# Patient Record
Sex: FEMALE | Race: WHITE | NOT HISPANIC OR LATINO | Employment: FULL TIME | URBAN - METROPOLITAN AREA
[De-identification: names, ages, dates, MRNs, and addresses within clinical notes are randomized per-mention and may not be internally consistent; named-entity substitution may affect disease eponyms.]

---

## 2023-03-08 DIAGNOSIS — I10 HYPERTENSION, UNSPECIFIED TYPE: ICD-10-CM

## 2023-03-08 PROBLEM — K76.0 FATTY LIVER: Status: ACTIVE | Noted: 2023-03-08

## 2023-03-08 PROBLEM — R31.9 HEMATURIA: Status: ACTIVE | Noted: 2023-03-08

## 2023-03-08 PROBLEM — E53.8 VITAMIN B12 DEFICIENCY: Status: ACTIVE | Noted: 2023-03-08

## 2023-03-08 PROBLEM — L98.9 SKIN LESION: Status: ACTIVE | Noted: 2023-03-08

## 2023-03-08 PROBLEM — K21.9 LARYNGOPHARYNGEAL REFLUX (LPR): Status: ACTIVE | Noted: 2023-03-08

## 2023-03-08 PROBLEM — E78.5 HYPERLIPEMIA: Status: ACTIVE | Noted: 2023-03-08

## 2023-03-08 PROBLEM — R91.1 LUNG NODULE: Status: ACTIVE | Noted: 2023-03-08

## 2023-03-08 PROBLEM — Z22.7 LATENT TUBERCULOSIS BY BLOOD TEST: Status: ACTIVE | Noted: 2023-03-08

## 2023-03-08 PROBLEM — E04.9 THYROID GOITER: Status: ACTIVE | Noted: 2023-03-08

## 2023-03-08 PROBLEM — F17.200 SMOKING ADDICTION: Status: ACTIVE | Noted: 2023-03-08

## 2023-03-08 PROBLEM — F17.200 NICOTINE DEPENDENCE: Status: ACTIVE | Noted: 2023-03-08

## 2023-03-08 PROBLEM — F41.9 ANXIETY: Status: ACTIVE | Noted: 2023-03-08

## 2023-03-08 PROBLEM — E78.5 DYSLIPIDEMIA: Status: ACTIVE | Noted: 2023-03-08

## 2023-03-08 PROBLEM — E74.39 GLUCOSE INTOLERANCE: Status: ACTIVE | Noted: 2023-03-08

## 2023-03-08 PROBLEM — J18.9 PNEUMONIA: Status: ACTIVE | Noted: 2023-03-08

## 2023-03-08 PROBLEM — R43.9 TASTE DISORDER: Status: ACTIVE | Noted: 2023-03-08

## 2023-03-08 PROBLEM — Z12.11 ENCOUNTER FOR SCREENING COLONOSCOPY: Status: ACTIVE | Noted: 2023-03-08

## 2023-03-08 PROBLEM — R43.8 HYPOSMIA: Status: ACTIVE | Noted: 2023-03-08

## 2023-03-08 PROBLEM — R73.9 HYPERGLYCEMIA: Status: ACTIVE | Noted: 2023-03-08

## 2023-03-08 PROBLEM — J34.2 NASAL SEPTAL DEVIATION: Status: ACTIVE | Noted: 2023-03-08

## 2023-03-08 PROBLEM — E11.9 DIABETES MELLITUS, TYPE 2 (MULTI): Status: ACTIVE | Noted: 2023-03-08

## 2023-03-08 PROBLEM — K85.90 ACUTE PANCREATITIS (HHS-HCC): Status: ACTIVE | Noted: 2023-03-08

## 2023-03-08 PROBLEM — R19.7 DIARRHEA: Status: ACTIVE | Noted: 2023-03-08

## 2023-03-08 PROBLEM — L52 ERYTHEMA NODOSUM: Status: ACTIVE | Noted: 2023-03-08

## 2023-03-08 PROBLEM — F32.A DEPRESSION: Status: ACTIVE | Noted: 2023-03-08

## 2023-03-08 PROBLEM — H52.13 MYOPIA WITH PRESBYOPIA OF BOTH EYES: Status: ACTIVE | Noted: 2023-03-08

## 2023-03-08 PROBLEM — E87.6 HYPOKALEMIA: Status: ACTIVE | Noted: 2023-03-08

## 2023-03-08 PROBLEM — N76.4 VULVAR ABSCESS: Status: ACTIVE | Noted: 2023-03-08

## 2023-03-08 PROBLEM — Z86.11 HISTORY OF TB (TUBERCULOSIS): Status: ACTIVE | Noted: 2023-03-08

## 2023-03-08 PROBLEM — J01.90 ACUTE SINUSITIS: Status: ACTIVE | Noted: 2023-03-08

## 2023-03-08 PROBLEM — R13.10 DYSPHAGIA: Status: ACTIVE | Noted: 2023-03-08

## 2023-03-08 PROBLEM — D36.9 TUBULAR ADENOMA: Status: ACTIVE | Noted: 2023-03-08

## 2023-03-08 PROBLEM — J35.1 LINGUAL TONSIL HYPERTROPHY: Status: ACTIVE | Noted: 2023-03-08

## 2023-03-08 PROBLEM — H57.89 EYE LUMP: Status: ACTIVE | Noted: 2023-03-08

## 2023-03-08 PROBLEM — E55.9 VITAMIN D DEFICIENCY: Status: ACTIVE | Noted: 2023-03-08

## 2023-03-08 PROBLEM — J31.0 RHINITIS: Status: ACTIVE | Noted: 2023-03-08

## 2023-03-08 PROBLEM — J34.3 HYPERTROPHY, NASAL, TURBINATE: Status: ACTIVE | Noted: 2023-03-08

## 2023-03-08 PROBLEM — E50.9 VITAMIN A DEFICIENCY: Status: ACTIVE | Noted: 2023-03-08

## 2023-03-08 PROBLEM — R43.9 SMELL DISORDER: Status: ACTIVE | Noted: 2023-03-08

## 2023-03-08 PROBLEM — H52.4 MYOPIA WITH PRESBYOPIA OF BOTH EYES: Status: ACTIVE | Noted: 2023-03-08

## 2023-03-08 PROBLEM — R07.89 ATYPICAL CHEST PAIN: Status: ACTIVE | Noted: 2023-03-08

## 2023-03-08 PROBLEM — D06.9 SEVERE CERVICAL DYSPLASIA: Status: ACTIVE | Noted: 2023-03-08

## 2023-03-08 PROBLEM — F32.9 MAJOR DEPRESSION: Status: ACTIVE | Noted: 2023-03-08

## 2023-03-08 PROBLEM — D72.829 LEUCOCYTOSIS: Status: ACTIVE | Noted: 2023-03-08

## 2023-03-08 PROBLEM — J30.9 ALLERGIC RHINITIS: Status: ACTIVE | Noted: 2023-03-08

## 2023-03-08 PROBLEM — R53.83 FATIGUE: Status: ACTIVE | Noted: 2023-03-08

## 2023-03-08 PROBLEM — K21.9 GERD (GASTROESOPHAGEAL REFLUX DISEASE): Status: ACTIVE | Noted: 2023-03-08

## 2023-03-08 RX ORDER — METOPROLOL TARTRATE 25 MG/1
1 TABLET, FILM COATED ORAL 2 TIMES DAILY
COMMUNITY
Start: 2023-02-13 | End: 2023-03-08 | Stop reason: SDUPTHER

## 2023-03-08 RX ORDER — MAGNESIUM GLYCINATE 100 MG
CAPSULE ORAL
COMMUNITY

## 2023-03-08 RX ORDER — METOPROLOL TARTRATE 25 MG/1
25 TABLET, FILM COATED ORAL 2 TIMES DAILY
Qty: 90 TABLET | Refills: 0 | Status: SHIPPED | OUTPATIENT
Start: 2023-03-08 | End: 2023-04-28

## 2023-03-08 RX ORDER — BLOOD-GLUCOSE METER
EACH MISCELLANEOUS
COMMUNITY
End: 2023-07-18

## 2023-03-08 RX ORDER — HYDROXYZINE HYDROCHLORIDE 25 MG/1
25 TABLET, FILM COATED ORAL 3 TIMES DAILY PRN
COMMUNITY
End: 2023-11-22 | Stop reason: ALTCHOICE

## 2023-03-08 RX ORDER — CHOLECALCIFEROL (VITAMIN D3) 25 MCG
TABLET ORAL
COMMUNITY

## 2023-03-08 RX ORDER — LANOLIN ALCOHOL/MO/W.PET/CERES
CREAM (GRAM) TOPICAL
COMMUNITY

## 2023-03-08 RX ORDER — ESCITALOPRAM OXALATE 5 MG/1
1 TABLET ORAL DAILY
COMMUNITY
Start: 2022-12-15 | End: 2023-04-12

## 2023-03-08 RX ORDER — AMLODIPINE BESYLATE 5 MG/1
5 TABLET ORAL DAILY
COMMUNITY
End: 2023-05-11

## 2023-03-08 RX ORDER — ESCITALOPRAM OXALATE 10 MG/1
1 TABLET ORAL DAILY
COMMUNITY
Start: 2023-02-13 | End: 2023-05-11

## 2023-03-08 RX ORDER — ONDANSETRON 4 MG/1
1 TABLET, ORALLY DISINTEGRATING ORAL EVERY 6 HOURS
COMMUNITY
Start: 2022-07-06

## 2023-04-12 ENCOUNTER — OFFICE VISIT (OUTPATIENT)
Dept: PRIMARY CARE | Facility: CLINIC | Age: 63
End: 2023-04-12
Payer: COMMERCIAL

## 2023-04-12 VITALS
DIASTOLIC BLOOD PRESSURE: 68 MMHG | HEIGHT: 64 IN | RESPIRATION RATE: 16 BRPM | BODY MASS INDEX: 24.96 KG/M2 | SYSTOLIC BLOOD PRESSURE: 122 MMHG | WEIGHT: 146.2 LBS | HEART RATE: 73 BPM | TEMPERATURE: 98.2 F | OXYGEN SATURATION: 95 %

## 2023-04-12 DIAGNOSIS — Z09 HOSPITAL DISCHARGE FOLLOW-UP: ICD-10-CM

## 2023-04-12 DIAGNOSIS — E11.9 TYPE 2 DIABETES MELLITUS WITHOUT COMPLICATION, WITHOUT LONG-TERM CURRENT USE OF INSULIN (MULTI): Primary | ICD-10-CM

## 2023-04-12 DIAGNOSIS — F41.9 ANXIETY: ICD-10-CM

## 2023-04-12 DIAGNOSIS — F17.200 SMOKING ADDICTION: ICD-10-CM

## 2023-04-12 DIAGNOSIS — Z86.11 HISTORY OF TB (TUBERCULOSIS): ICD-10-CM

## 2023-04-12 DIAGNOSIS — I10 ESSENTIAL HYPERTENSION: ICD-10-CM

## 2023-04-12 PROBLEM — D22.5 ATYPICAL NEVUS OF BACK: Status: ACTIVE | Noted: 2019-01-08

## 2023-04-12 LAB — POC HEMOGLOBIN A1C: 5.7 % (ref 4.2–6.5)

## 2023-04-12 PROCEDURE — 3078F DIAST BP <80 MM HG: CPT | Performed by: INTERNAL MEDICINE

## 2023-04-12 PROCEDURE — 99214 OFFICE O/P EST MOD 30 MIN: CPT | Performed by: INTERNAL MEDICINE

## 2023-04-12 PROCEDURE — 3074F SYST BP LT 130 MM HG: CPT | Performed by: INTERNAL MEDICINE

## 2023-04-12 PROCEDURE — 83036 HEMOGLOBIN GLYCOSYLATED A1C: CPT | Performed by: INTERNAL MEDICINE

## 2023-04-12 ASSESSMENT — PATIENT HEALTH QUESTIONNAIRE - PHQ9
3. TROUBLE FALLING OR STAYING ASLEEP OR SLEEPING TOO MUCH: SEVERAL DAYS
SUM OF ALL RESPONSES TO PHQ QUESTIONS 1-9: 7
SUM OF ALL RESPONSES TO PHQ9 QUESTIONS 1 AND 2: 4
6. FEELING BAD ABOUT YOURSELF - OR THAT YOU ARE A FAILURE OR HAVE LET YOURSELF OR YOUR FAMILY DOWN: NOT AT ALL
10. IF YOU CHECKED OFF ANY PROBLEMS, HOW DIFFICULT HAVE THESE PROBLEMS MADE IT FOR YOU TO DO YOUR WORK, TAKE CARE OF THINGS AT HOME, OR GET ALONG WITH OTHER PEOPLE: SOMEWHAT DIFFICULT
8. MOVING OR SPEAKING SO SLOWLY THAT OTHER PEOPLE COULD HAVE NOTICED. OR THE OPPOSITE, BEING SO FIGETY OR RESTLESS THAT YOU HAVE BEEN MOVING AROUND A LOT MORE THAN USUAL: NOT AT ALL
7. TROUBLE CONCENTRATING ON THINGS, SUCH AS READING THE NEWSPAPER OR WATCHING TELEVISION: NOT AT ALL
5. POOR APPETITE OR OVEREATING: NOT AT ALL
1. LITTLE INTEREST OR PLEASURE IN DOING THINGS: MORE THAN HALF THE DAYS
2. FEELING DOWN, DEPRESSED OR HOPELESS: MORE THAN HALF THE DAYS
9. THOUGHTS THAT YOU WOULD BE BETTER OFF DEAD, OR OF HURTING YOURSELF: NOT AT ALL
4. FEELING TIRED OR HAVING LITTLE ENERGY: MORE THAN HALF THE DAYS

## 2023-04-12 ASSESSMENT — ENCOUNTER SYMPTOMS
JOINT SWELLING: 0
ARTHRALGIAS: 0
SORE THROAT: 0
ABDOMINAL PAIN: 0
VOMITING: 0
NAUSEA: 0
CHEST TIGHTNESS: 0
DIZZINESS: 0
PALPITATIONS: 0
WEAKNESS: 0
SLEEP DISTURBANCE: 0
WHEEZING: 0
COUGH: 0
CONSTIPATION: 0
CHILLS: 0
SHORTNESS OF BREATH: 0
DYSURIA: 0
DIARRHEA: 0
FATIGUE: 0
ADENOPATHY: 0
UNEXPECTED WEIGHT CHANGE: 0
CONFUSION: 0

## 2023-04-12 NOTE — PROGRESS NOTES
Subjective   Merced Allan is a 62 y.o. female who presents for Follow-up and Diabetes (Follow up -DM - checking daily 98s-130s/ER visit  for SOB, chest pain, panic attacks).  Follow up DM-checking daily at home is been good 90s-130s ,last HGBA1C was 5.7 on 10.31.22, today is 5.7   Er visit follow up - patient went to ER [facility] on 3.31.23 for chest pain and SOB, was told was anxiety related, panic attacks ,feeling better now , no more panic attacks since that day     Diabetes  She presents for her follow-up diabetic visit. She has type 2 diabetes mellitus. No MedicAlert identification noted. Her disease course has been stable. There are no hypoglycemic associated symptoms. Pertinent negatives for hypoglycemia include no confusion or dizziness. Pertinent negatives for diabetes include no fatigue and no weakness.       Review of Systems   Constitutional:  Negative for chills, fatigue and unexpected weight change.        Comment   HENT:  Negative for congestion, ear pain and sore throat.    Respiratory:  Negative for cough, chest tightness, shortness of breath and wheezing.    Cardiovascular:  Negative for palpitations and leg swelling.   Gastrointestinal:  Negative for abdominal pain, constipation, diarrhea, nausea and vomiting.   Genitourinary:  Negative for dysuria and urgency.   Musculoskeletal:  Negative for arthralgias and joint swelling.   Skin:  Negative for rash.   Neurological:  Negative for dizziness and weakness.   Hematological:  Negative for adenopathy.   Psychiatric/Behavioral:  Negative for confusion and sleep disturbance.        Objective   Physical Exam  Constitutional:       Appearance: Normal appearance.   HENT:      Head: Normocephalic and atraumatic.   Eyes:      Conjunctiva/sclera: Conjunctivae normal.   Neck:      Vascular: No carotid bruit.   Cardiovascular:      Rate and Rhythm: Normal rate and regular rhythm.      Heart sounds: No murmur heard.  Pulmonary:      Effort: No respiratory  "distress.      Breath sounds: No wheezing, rhonchi or rales.   Chest:      Chest wall: No tenderness.   Abdominal:      General: Bowel sounds are normal. There is no distension.      Palpations: Abdomen is soft. There is no mass.      Tenderness: There is no abdominal tenderness.   Musculoskeletal:      Cervical back: Neck supple.   Lymphadenopathy:      Cervical: No cervical adenopathy.   Skin:     Coloration: Skin is not jaundiced.      Findings: No rash.   Neurological:      General: No focal deficit present.      Mental Status: She is alert and oriented to person, place, and time. Mental status is at baseline.      Motor: No weakness.      Gait: Gait normal.   Psychiatric:         Mood and Affect: Mood normal.         Behavior: Behavior normal.         Judgment: Judgment normal.       /68 (BP Location: Left arm, Patient Position: Sitting)   Pulse 73   Temp 36.8 °C (98.2 °F)   Resp 16   Ht 1.626 m (5' 4\")   Wt 66.3 kg (146 lb 3.2 oz)   SpO2 95% Comment: RA  BMI 25.10 kg/m²         Assessment/Plan   Problem List Items Addressed This Visit       Diabetes mellitus, type 2 (CMS/HCC) - Primary     Not on medication         Relevant Orders    POCT glycosylated hemoglobin (Hb A1C) manually resulted    Anxiety     Better on lexapro         History of TB (tuberculosis)    Relevant Orders    XR chest 2 views    Smoking addiction     Not ready to quite         Essential hypertension     Controlled , no med side effects             "

## 2023-04-19 ENCOUNTER — TELEPHONE (OUTPATIENT)
Dept: PRIMARY CARE | Facility: CLINIC | Age: 63
End: 2023-04-19
Payer: COMMERCIAL

## 2023-04-19 DIAGNOSIS — Z86.11 HISTORY OF TB (TUBERCULOSIS): Primary | ICD-10-CM

## 2023-04-19 NOTE — TELEPHONE ENCOUNTER
Patient CT  chest is 5.4.2023, she will have the lab done today and states she really needs  a letter for work after you receive the lab report to go to work please

## 2023-04-19 NOTE — TELEPHONE ENCOUNTER
Patient aware of chest xray report, aware to have the CT chest done, aware lab order for TB spot lab in the system,  phone number given to call for paxton and call us back with the paxton date

## 2023-04-24 ENCOUNTER — LAB (OUTPATIENT)
Dept: LAB | Facility: LAB | Age: 63
End: 2023-04-24
Payer: COMMERCIAL

## 2023-04-24 DIAGNOSIS — Z86.11 HISTORY OF TB (TUBERCULOSIS): ICD-10-CM

## 2023-04-24 LAB
COBALAMIN (VITAMIN B12) (PG/ML) IN SER/PLAS: 492 PG/ML (ref 211–911)
MAGNESIUM (MG/DL) IN SER/PLAS: 2.21 MG/DL (ref 1.6–2.4)

## 2023-04-24 PROCEDURE — 86481 TB AG RESPONSE T-CELL SUSP: CPT

## 2023-04-24 PROCEDURE — 36415 COLL VENOUS BLD VENIPUNCTURE: CPT

## 2023-04-26 NOTE — TELEPHONE ENCOUNTER
Patient states she got a letter from ins stating  Ct will be covered , the Ct chest will be on 5.4.23 ,also she is asking if you ordered K level to be done too or possible she misunderstood it was just B12 and MG . Thank you !

## 2023-04-27 DIAGNOSIS — I10 ESSENTIAL HYPERTENSION: Primary | ICD-10-CM

## 2023-04-27 LAB
NIL(NEG) CONTROL SPOT COUNT: ABNORMAL
PANEL A SPOT COUNT: 42
PANEL B SPOT COUNT: 44
POS CONTROL SPOT COUNT: ABNORMAL
T-SPOT. TB INTERPRETATION: ABNORMAL

## 2023-04-28 DIAGNOSIS — I10 HYPERTENSION, UNSPECIFIED TYPE: ICD-10-CM

## 2023-04-28 DIAGNOSIS — R00.2 PALPITATION: Primary | ICD-10-CM

## 2023-04-28 RX ORDER — METOPROLOL TARTRATE 25 MG/1
25 TABLET, FILM COATED ORAL 2 TIMES DAILY
Qty: 60 TABLET | Refills: 1 | Status: SHIPPED | OUTPATIENT
Start: 2023-04-28 | End: 2023-06-05

## 2023-05-08 ENCOUNTER — OFFICE VISIT (OUTPATIENT)
Dept: PRIMARY CARE | Facility: CLINIC | Age: 63
End: 2023-05-08
Payer: COMMERCIAL

## 2023-05-08 ENCOUNTER — TELEPHONE (OUTPATIENT)
Dept: PRIMARY CARE | Facility: CLINIC | Age: 63
End: 2023-05-08

## 2023-05-08 VITALS
WEIGHT: 147.6 LBS | TEMPERATURE: 97.9 F | HEIGHT: 64 IN | HEART RATE: 85 BPM | SYSTOLIC BLOOD PRESSURE: 110 MMHG | DIASTOLIC BLOOD PRESSURE: 62 MMHG | OXYGEN SATURATION: 97 % | RESPIRATION RATE: 16 BRPM | BODY MASS INDEX: 25.2 KG/M2

## 2023-05-08 DIAGNOSIS — R91.1 LUNG NODULE: Primary | ICD-10-CM

## 2023-05-08 DIAGNOSIS — I10 ESSENTIAL HYPERTENSION: ICD-10-CM

## 2023-05-08 DIAGNOSIS — Z22.7 LATENT TUBERCULOSIS BY BLOOD TEST: ICD-10-CM

## 2023-05-08 DIAGNOSIS — K21.9 GASTROESOPHAGEAL REFLUX DISEASE WITHOUT ESOPHAGITIS: ICD-10-CM

## 2023-05-08 DIAGNOSIS — F17.219 CIGARETTE NICOTINE DEPENDENCE WITH NICOTINE-INDUCED DISORDER: ICD-10-CM

## 2023-05-08 DIAGNOSIS — Z12.31 ENCOUNTER FOR SCREENING MAMMOGRAM FOR MALIGNANT NEOPLASM OF BREAST: ICD-10-CM

## 2023-05-08 PROCEDURE — 99214 OFFICE O/P EST MOD 30 MIN: CPT | Performed by: INTERNAL MEDICINE

## 2023-05-08 PROCEDURE — 3074F SYST BP LT 130 MM HG: CPT | Performed by: INTERNAL MEDICINE

## 2023-05-08 PROCEDURE — 3078F DIAST BP <80 MM HG: CPT | Performed by: INTERNAL MEDICINE

## 2023-05-08 ASSESSMENT — ENCOUNTER SYMPTOMS
ARTHRALGIAS: 0
FATIGUE: 0
DIZZINESS: 0
SHORTNESS OF BREATH: 0
SEIZURES: 0
ADENOPATHY: 0
WEAKNESS: 0
CONSTIPATION: 0
COUGH: 0
VOMITING: 0
DIARRHEA: 0
CONFUSION: 0
PALPITATIONS: 0
SLEEP DISTURBANCE: 0
DYSURIA: 0
UNEXPECTED WEIGHT CHANGE: 0
JOINT SWELLING: 0
CHILLS: 0
SORE THROAT: 0
CARDIOVASCULAR NEGATIVE: 1
WHEEZING: 0
RESPIRATORY NEGATIVE: 1
ABDOMINAL PAIN: 0
CHEST TIGHTNESS: 0
NAUSEA: 0

## 2023-05-08 ASSESSMENT — PATIENT HEALTH QUESTIONNAIRE - PHQ9
2. FEELING DOWN, DEPRESSED OR HOPELESS: NOT AT ALL
SUM OF ALL RESPONSES TO PHQ9 QUESTIONS 1 AND 2: 0
1. LITTLE INTEREST OR PLEASURE IN DOING THINGS: NOT AT ALL

## 2023-05-08 NOTE — PROGRESS NOTES
Subjective   Merced Allan is a 62 y.o. female who presents for Follow-up (Follow up test reports).  Follow up chest xray, labs , T spot- 4.2023  Follow up CT chest- 5.2023-  Patient daily smoker 40 y , lately 1 ppd.  Labs and test reviewed, discussed with patient plan          To see  TB clinic soon sec to  positive t spot.    Review of Systems   Constitutional:  Negative for chills, fatigue and unexpected weight change.        Comment   HENT:  Negative for congestion, ear pain and sore throat.    Respiratory: Negative.  Negative for cough, chest tightness, shortness of breath and wheezing.    Cardiovascular: Negative.  Negative for palpitations and leg swelling.   Gastrointestinal:  Negative for abdominal pain, constipation, diarrhea, nausea and vomiting.   Genitourinary:  Negative for dysuria and urgency.   Musculoskeletal:  Negative for arthralgias and joint swelling.   Skin:  Negative for rash.   Neurological:  Negative for dizziness, seizures and weakness.   Hematological:  Negative for adenopathy.   Psychiatric/Behavioral:  Negative for confusion and sleep disturbance.        Objective   Physical Exam  Constitutional:       Appearance: Normal appearance.   HENT:      Head: Normocephalic and atraumatic.   Eyes:      Pupils: Pupils are equal, round, and reactive to light.   Cardiovascular:      Rate and Rhythm: Normal rate and regular rhythm.   Pulmonary:      Effort: Pulmonary effort is normal.      Breath sounds: Normal breath sounds.   Musculoskeletal:         General: Normal range of motion.      Cervical back: Normal range of motion and neck supple.   Skin:     General: Skin is warm.   Neurological:      General: No focal deficit present.      Mental Status: She is alert and oriented to person, place, and time.   Psychiatric:         Mood and Affect: Mood normal.         Behavior: Behavior normal.       /62 (BP Location: Left arm, Patient Position: Sitting)   Pulse 85   Temp 36.6 °C (97.9 °F)    "Resp 16   Ht 1.626 m (5' 4\")   Wt 67 kg (147 lb 9.6 oz)   SpO2 97% Comment: RA  BMI 25.34 kg/m²     Lab Results   Component Value Date    WBC 8.8 07/06/2022    HGB 13.0 07/06/2022    HCT 38.7 07/06/2022    MCV 88 07/06/2022     07/06/2022     Lab Results   Component Value Date    HGBA1C 5.7 04/12/2023   === 04/12/23 ===    CT CHEST WO IV CONTRAST    - Impression -  1. Unchanged few noncalcified lung nodules measuring up to 3 mm,  stable dating back to 05/25/2021, consistent with benign etiology. If  the patient fulfils the criteria for lung cancer screening, continued  annual non contrast CT scan of chest can be performed.(Naresh Garcia  et al., Guidelines for management of incidental pulmonary nodules  detected on CT images: From the Fleischner Society 2017, Radiology.  2017 Jul;284 (1):228-243.) FLEISCHNER FOLLOW UP.ACR.[email or web address]  2. Subtle ground-glass opacities within bilateral lungs,  predominantly involving the upper lobes, most consistent with changes  of respiratory bronchiolitis, overall similar to prior CT on  05/25/2021.  3. Diffuse thickened appearance of the esophagus slightly more  pronounced compared to prior studies, could be due to esophagitis.  Underlying neoplastic process would be less likely. Consider  correlation with endoscopy for further assessment.  4. Mildly prominent nonspecific mediastinal and paraesophageal and  axillary nodes.  5. Focus of increased density/stranding within the anteromedial right  breast as described could be sequela of mastitis or posttraumatic  change among other etiologies. Clinical correlation and follow-up  advised and consider dedicated mammogram/ultrasound for further  assessment.  6.  Additional findings as above.    I personally reviewed the images/study and I agree with the findings  as stated. This study was interpreted at [facility], [place], [place].  Had egd done in 2021,     Assessment/Plan   Problem List Items " Addressed This Visit       GERD (gastroesophageal reflux disease)     Had  egd 2021         Latent tuberculosis by blood test     Positive ppd         Lung nodule - Primary     Ct 2023, stable   Ct in 1y         Nicotine dependence    Essential hypertension     Other Visit Diagnoses       Encounter for screening mammogram for malignant neoplasm of breast        Relevant Orders    BI mammo bilateral screening tomosynthesis

## 2023-05-10 DIAGNOSIS — I10 ESSENTIAL (PRIMARY) HYPERTENSION: Primary | ICD-10-CM

## 2023-05-11 ENCOUNTER — APPOINTMENT (OUTPATIENT)
Dept: PRIMARY CARE | Facility: CLINIC | Age: 63
End: 2023-05-11
Payer: COMMERCIAL

## 2023-05-11 DIAGNOSIS — F32.0 MAJOR DEPRESSIVE DISORDER, SINGLE EPISODE, MILD (CMS-HCC): ICD-10-CM

## 2023-05-11 RX ORDER — ESCITALOPRAM OXALATE 10 MG/1
TABLET ORAL
Qty: 90 TABLET | Refills: 1 | Status: SHIPPED | OUTPATIENT
Start: 2023-05-11 | End: 2023-11-13

## 2023-05-11 RX ORDER — AMLODIPINE BESYLATE 5 MG/1
TABLET ORAL
Qty: 90 TABLET | Refills: 1 | Status: SHIPPED | OUTPATIENT
Start: 2023-05-11 | End: 2023-11-15

## 2023-05-17 ENCOUNTER — TELEPHONE (OUTPATIENT)
Dept: PRIMARY CARE | Facility: CLINIC | Age: 63
End: 2023-05-17

## 2023-05-17 NOTE — TELEPHONE ENCOUNTER
Patient was N/S today ,please call and R/S paxton we have tests reports to revue with the patient,please let me know whe we have paxton date . Thank you !

## 2023-06-01 DIAGNOSIS — R00.2 PALPITATION: ICD-10-CM

## 2023-06-01 DIAGNOSIS — I10 HYPERTENSION, UNSPECIFIED TYPE: Primary | ICD-10-CM

## 2023-06-05 RX ORDER — METOPROLOL TARTRATE 25 MG/1
TABLET, FILM COATED ORAL
Qty: 60 TABLET | Refills: 1 | Status: SHIPPED | OUTPATIENT
Start: 2023-06-05 | End: 2023-07-06

## 2023-07-06 DIAGNOSIS — R00.2 PALPITATION: ICD-10-CM

## 2023-07-06 DIAGNOSIS — I10 HYPERTENSION, UNSPECIFIED TYPE: ICD-10-CM

## 2023-07-06 RX ORDER — METOPROLOL TARTRATE 25 MG/1
TABLET, FILM COATED ORAL
Qty: 60 TABLET | Refills: 3 | Status: SHIPPED | OUTPATIENT
Start: 2023-07-06 | End: 2023-10-13 | Stop reason: SDUPTHER

## 2023-07-12 ENCOUNTER — APPOINTMENT (OUTPATIENT)
Dept: PRIMARY CARE | Facility: CLINIC | Age: 63
End: 2023-07-12
Payer: COMMERCIAL

## 2023-07-17 DIAGNOSIS — E11.9 TYPE 2 DIABETES MELLITUS WITHOUT COMPLICATIONS (MULTI): ICD-10-CM

## 2023-07-18 RX ORDER — BLOOD-GLUCOSE METER
EACH MISCELLANEOUS
Qty: 100 STRIP | Refills: 1 | Status: SHIPPED | OUTPATIENT
Start: 2023-07-18 | End: 2024-01-30 | Stop reason: SDUPTHER

## 2023-08-18 ENCOUNTER — OFFICE VISIT (OUTPATIENT)
Dept: PRIMARY CARE | Facility: CLINIC | Age: 63
End: 2023-08-18
Payer: COMMERCIAL

## 2023-08-18 ENCOUNTER — TELEPHONE (OUTPATIENT)
Dept: PRIMARY CARE | Facility: CLINIC | Age: 63
End: 2023-08-18

## 2023-08-18 VITALS
SYSTOLIC BLOOD PRESSURE: 120 MMHG | TEMPERATURE: 97.3 F | BODY MASS INDEX: 24.52 KG/M2 | RESPIRATION RATE: 16 BRPM | WEIGHT: 143.6 LBS | DIASTOLIC BLOOD PRESSURE: 70 MMHG | OXYGEN SATURATION: 96 % | HEIGHT: 64 IN | HEART RATE: 77 BPM

## 2023-08-18 DIAGNOSIS — R00.2 PALPITATIONS: ICD-10-CM

## 2023-08-18 DIAGNOSIS — M25.512 CHRONIC LEFT SHOULDER PAIN: ICD-10-CM

## 2023-08-18 DIAGNOSIS — E11.9 TYPE 2 DIABETES MELLITUS WITHOUT COMPLICATION, WITHOUT LONG-TERM CURRENT USE OF INSULIN (MULTI): Primary | ICD-10-CM

## 2023-08-18 DIAGNOSIS — G89.29 CHRONIC LEFT SHOULDER PAIN: ICD-10-CM

## 2023-08-18 DIAGNOSIS — I10 ESSENTIAL HYPERTENSION: ICD-10-CM

## 2023-08-18 DIAGNOSIS — Z22.7 LATENT TUBERCULOSIS BY BLOOD TEST: ICD-10-CM

## 2023-08-18 DIAGNOSIS — Z86.11 HISTORY OF TB (TUBERCULOSIS): ICD-10-CM

## 2023-08-18 PROBLEM — Z86.16 PERSONAL HISTORY OF COVID-19: Status: ACTIVE | Noted: 2022-07-06

## 2023-08-18 PROBLEM — R73.9 HYPERGLYCEMIA: Status: RESOLVED | Noted: 2023-03-08 | Resolved: 2023-08-18

## 2023-08-18 PROBLEM — R42 DIZZINESS AND GIDDINESS: Status: ACTIVE | Noted: 2022-07-06

## 2023-08-18 PROBLEM — Z20.822 CONTACT WITH AND (SUSPECTED) EXPOSURE TO COVID-19: Status: ACTIVE | Noted: 2022-07-06

## 2023-08-18 PROBLEM — E74.39 GLUCOSE INTOLERANCE: Status: RESOLVED | Noted: 2023-03-08 | Resolved: 2023-08-18

## 2023-08-18 PROBLEM — R05.9 COUGH, UNSPECIFIED: Status: ACTIVE | Noted: 2022-07-06

## 2023-08-18 PROBLEM — E78.5 HYPERLIPEMIA: Status: RESOLVED | Noted: 2023-03-08 | Resolved: 2023-08-18

## 2023-08-18 PROBLEM — J01.90 ACUTE SINUSITIS: Status: RESOLVED | Noted: 2023-03-08 | Resolved: 2023-08-18

## 2023-08-18 PROBLEM — R05.9 COUGH: Status: ACTIVE | Noted: 2019-01-01

## 2023-08-18 LAB — POC HEMOGLOBIN A1C: 6 % (ref 4.2–6.5)

## 2023-08-18 PROCEDURE — 3078F DIAST BP <80 MM HG: CPT | Performed by: INTERNAL MEDICINE

## 2023-08-18 PROCEDURE — 83036 HEMOGLOBIN GLYCOSYLATED A1C: CPT | Performed by: INTERNAL MEDICINE

## 2023-08-18 PROCEDURE — 99214 OFFICE O/P EST MOD 30 MIN: CPT | Performed by: INTERNAL MEDICINE

## 2023-08-18 PROCEDURE — 3074F SYST BP LT 130 MM HG: CPT | Performed by: INTERNAL MEDICINE

## 2023-08-18 RX ORDER — DICLOFENAC SODIUM 10 MG/G
4 GEL TOPICAL 4 TIMES DAILY
Qty: 60 G | Refills: 3 | Status: SHIPPED | OUTPATIENT
Start: 2023-08-18

## 2023-08-18 RX ORDER — LIDOCAINE 50 MG/G
1 PATCH TOPICAL DAILY
Qty: 30 PATCH | Refills: 3 | Status: SHIPPED | OUTPATIENT
Start: 2023-08-18 | End: 2024-08-17

## 2023-08-18 ASSESSMENT — ENCOUNTER SYMPTOMS
SLEEP DISTURBANCE: 0
SORE THROAT: 0
DYSURIA: 0
DIARRHEA: 0
FATIGUE: 1
ARTHRALGIAS: 1
WEAKNESS: 0
UNEXPECTED WEIGHT CHANGE: 0
CHEST TIGHTNESS: 0
CHILLS: 0
WHEEZING: 0
RESPIRATORY NEGATIVE: 1
DIZZINESS: 0
CONSTIPATION: 0
PALPITATIONS: 0
ABDOMINAL PAIN: 0
COUGH: 0
CARDIOVASCULAR NEGATIVE: 1
CONFUSION: 0
JOINT SWELLING: 0
VOMITING: 0
NAUSEA: 0
ADENOPATHY: 0
HEADACHES: 0
SHORTNESS OF BREATH: 0

## 2023-08-18 ASSESSMENT — PATIENT HEALTH QUESTIONNAIRE - PHQ9
1. LITTLE INTEREST OR PLEASURE IN DOING THINGS: NOT AT ALL
SUM OF ALL RESPONSES TO PHQ9 QUESTIONS 1 AND 2: 0
2. FEELING DOWN, DEPRESSED OR HOPELESS: NOT AT ALL

## 2023-08-18 NOTE — PROGRESS NOTES
Subjective   Merced Allan is a 63 y.o. female who presents for Follow-up (Follow up DM/L shoulder pain /).  Follow up DM- last HGBA1C was 5.7 on 4.12.2023, today is 6.0  Checking BG at home daily  L shoulder pain- x 1 month , getting worse, pain  going on the neck and wrist -did not take any TX yet  Chest xray last done- 4.2023  Ct chest - 5.2023- when to repeat in 2024  Labs - 4.2023  T spot-4.2023 , positive, ct chest no tb, didn't see tbc clinic, no symptoms  Mammo- 5.2023      Review of Systems   Constitutional:  Positive for fatigue. Negative for chills and unexpected weight change.        Sometimes   HENT:  Negative for congestion, ear pain and sore throat.    Respiratory: Negative.  Negative for cough, chest tightness, shortness of breath and wheezing.    Cardiovascular: Negative.  Negative for palpitations and leg swelling.   Gastrointestinal:  Negative for abdominal pain, constipation, diarrhea, nausea and vomiting.   Genitourinary:  Negative for dysuria and urgency.   Musculoskeletal:  Positive for arthralgias. Negative for joint swelling.   Skin:  Negative for rash.   Neurological:  Negative for dizziness, weakness and headaches.   Hematological:  Negative for adenopathy.   Psychiatric/Behavioral:  Negative for confusion and sleep disturbance.        Objective   Physical Exam  Constitutional:       Appearance: Normal appearance.   HENT:      Head: Normocephalic and atraumatic.   Eyes:      Pupils: Pupils are equal, round, and reactive to light.   Cardiovascular:      Rate and Rhythm: Normal rate and regular rhythm.   Pulmonary:      Effort: Pulmonary effort is normal.      Breath sounds: Normal breath sounds.   Musculoskeletal:         General: Normal range of motion.      Cervical back: Normal range of motion and neck supple.      Right foot: No deformity, bunion or Charcot foot.      Left foot: No deformity, bunion or Charcot foot.   Feet:      Right foot:      Skin integrity: Skin integrity normal.  "No ulcer, callus or fissure.      Toenail Condition: Right toenails are normal.      Left foot:      Skin integrity: Skin integrity normal. No ulcer, callus or fissure.      Toenail Condition: Left toenails are normal.      Comments: Foot exam normal,   Skin:     General: Skin is warm.   Neurological:      General: No focal deficit present.      Mental Status: She is alert and oriented to person, place, and time.   Psychiatric:         Mood and Affect: Mood normal.         Behavior: Behavior normal.       /70 (BP Location: Left arm, Patient Position: Sitting)   Pulse 77   Temp 36.3 °C (97.3 °F)   Resp 16   Ht 1.626 m (5' 4\")   Wt 65.1 kg (143 lb 9.6 oz)   SpO2 96% Comment: RA  BMI 24.65 kg/m²       Assessment/Plan   Problem List Items Addressed This Visit       Diabetes mellitus, type 2 (CMS/HCC) - Primary     Hba1c 6, only on diet not on medication, to see eye dr         Relevant Orders    POCT glycosylated hemoglobin (Hb A1C) manually resulted    Referral to Ophthalmology    History of TB (tuberculosis)    Latent tuberculosis by blood test    Essential hypertension    Chronic left shoulder pain    Relevant Medications    diclofenac sodium (Voltaren) 1 % gel gel    lidocaine (Lidoderm) 5 % patch    Other Relevant Orders    Referral to Physical Therapy    RESOLVED: Palpitations       "

## 2023-08-18 NOTE — TELEPHONE ENCOUNTER
Pt is scheduled at 10am- she is asking if she can come in a little later- she said there was an issue at her house last night and she didn't get any sleep, she is hoping to come in at 1130 so she can get some sleep.

## 2023-08-18 NOTE — PATIENT INSTRUCTIONS
Was nice seeing you today.  Continue same medication.  Have lab work done before next appointment if labs were ordered today.  Fu in 3 month.  Call/ contact our office with any concerns.  Please see TBC clinic at [facility]  See ophthalmology  Use Lidoderm patc q12h and diclofenac cream bid

## 2023-11-12 DIAGNOSIS — F32.0 MAJOR DEPRESSIVE DISORDER, SINGLE EPISODE, MILD (CMS-HCC): Primary | ICD-10-CM

## 2023-11-13 RX ORDER — ESCITALOPRAM OXALATE 10 MG/1
TABLET ORAL
Qty: 90 TABLET | Refills: 1 | Status: SHIPPED | OUTPATIENT
Start: 2023-11-13 | End: 2024-04-10 | Stop reason: SDUPTHER

## 2023-11-15 ENCOUNTER — TELEPHONE (OUTPATIENT)
Dept: PRIMARY CARE | Facility: CLINIC | Age: 63
End: 2023-11-15

## 2023-11-15 DIAGNOSIS — I10 ESSENTIAL (PRIMARY) HYPERTENSION: Primary | ICD-10-CM

## 2023-11-15 RX ORDER — AMLODIPINE BESYLATE 5 MG/1
TABLET ORAL
Qty: 90 TABLET | Refills: 0 | Status: SHIPPED | OUTPATIENT
Start: 2023-11-15 | End: 2024-01-30 | Stop reason: SDUPTHER

## 2023-11-22 ENCOUNTER — OFFICE VISIT (OUTPATIENT)
Dept: PRIMARY CARE | Facility: CLINIC | Age: 63
End: 2023-11-22
Payer: COMMERCIAL

## 2023-11-22 ENCOUNTER — APPOINTMENT (OUTPATIENT)
Dept: PRIMARY CARE | Facility: CLINIC | Age: 63
End: 2023-11-22
Payer: COMMERCIAL

## 2023-11-22 ENCOUNTER — ANCILLARY PROCEDURE (OUTPATIENT)
Dept: RADIOLOGY | Facility: CLINIC | Age: 63
End: 2023-11-22
Payer: COMMERCIAL

## 2023-11-22 VITALS
OXYGEN SATURATION: 98 % | DIASTOLIC BLOOD PRESSURE: 74 MMHG | SYSTOLIC BLOOD PRESSURE: 122 MMHG | TEMPERATURE: 98.8 F | HEART RATE: 83 BPM | BODY MASS INDEX: 24.41 KG/M2 | HEIGHT: 64 IN | WEIGHT: 143 LBS

## 2023-11-22 DIAGNOSIS — M25.512 LEFT SHOULDER PAIN, UNSPECIFIED CHRONICITY: ICD-10-CM

## 2023-11-22 DIAGNOSIS — Z86.11 HISTORY OF TB (TUBERCULOSIS): ICD-10-CM

## 2023-11-22 DIAGNOSIS — Z22.7 LATENT TUBERCULOSIS BY BLOOD TEST: ICD-10-CM

## 2023-11-22 DIAGNOSIS — E11.00 TYPE 2 DIABETES MELLITUS WITH HYPEROSMOLARITY WITHOUT COMA, WITHOUT LONG-TERM CURRENT USE OF INSULIN (MULTI): ICD-10-CM

## 2023-11-22 DIAGNOSIS — L52 ERYTHEMA NODOSUM: ICD-10-CM

## 2023-11-22 DIAGNOSIS — Z00.00 ANNUAL PHYSICAL EXAM: Primary | ICD-10-CM

## 2023-11-22 DIAGNOSIS — E55.9 VITAMIN D DEFICIENCY: ICD-10-CM

## 2023-11-22 DIAGNOSIS — R22.9 SUBCUTANEOUS NODULES: ICD-10-CM

## 2023-11-22 DIAGNOSIS — E53.8 VITAMIN B12 DEFICIENCY: ICD-10-CM

## 2023-11-22 LAB — POC HEMOGLOBIN A1C: 6 % (ref 4.2–6.5)

## 2023-11-22 PROCEDURE — 3078F DIAST BP <80 MM HG: CPT | Performed by: INTERNAL MEDICINE

## 2023-11-22 PROCEDURE — 73030 X-RAY EXAM OF SHOULDER: CPT | Mod: LT

## 2023-11-22 PROCEDURE — 4010F ACE/ARB THERAPY RXD/TAKEN: CPT | Performed by: INTERNAL MEDICINE

## 2023-11-22 PROCEDURE — 99396 PREV VISIT EST AGE 40-64: CPT | Performed by: INTERNAL MEDICINE

## 2023-11-22 PROCEDURE — 73030 X-RAY EXAM OF SHOULDER: CPT | Mod: LEFT SIDE | Performed by: RADIOLOGY

## 2023-11-22 PROCEDURE — 83036 HEMOGLOBIN GLYCOSYLATED A1C: CPT | Performed by: INTERNAL MEDICINE

## 2023-11-22 PROCEDURE — 3074F SYST BP LT 130 MM HG: CPT | Performed by: INTERNAL MEDICINE

## 2023-11-22 RX ORDER — GLIMEPIRIDE 1 MG/1
TABLET ORAL
COMMUNITY
Start: 2021-01-21 | End: 2023-11-22 | Stop reason: ALTCHOICE

## 2023-11-22 RX ORDER — IBUPROFEN 400 MG/1
TABLET ORAL
COMMUNITY
Start: 2021-05-06

## 2023-11-22 RX ORDER — AZELASTINE 1 MG/ML
SPRAY, METERED NASAL
COMMUNITY
Start: 2021-02-01

## 2023-11-22 RX ORDER — MELOXICAM 7.5 MG/1
7.5 TABLET ORAL DAILY
Qty: 30 TABLET | Refills: 11 | Status: SHIPPED | OUTPATIENT
Start: 2023-11-22 | End: 2024-11-21

## 2023-11-22 RX ORDER — LANSOPRAZOLE 30 MG/1
CAPSULE, DELAYED RELEASE ORAL
COMMUNITY

## 2023-11-22 RX ORDER — NITROGLYCERIN 0.4 MG/1
TABLET SUBLINGUAL
COMMUNITY

## 2023-11-22 RX ORDER — PREDNISONE 50 MG/1
1 TABLET ORAL DAILY
COMMUNITY
Start: 2023-09-29

## 2023-11-22 RX ORDER — ATORVASTATIN CALCIUM 20 MG/1
20 TABLET, FILM COATED ORAL DAILY
COMMUNITY

## 2023-11-22 RX ORDER — TRIAMCINOLONE ACETONIDE 55 UG/1
SPRAY, METERED NASAL
COMMUNITY
Start: 2021-02-01 | End: 2023-11-22 | Stop reason: ALTCHOICE

## 2023-11-22 RX ORDER — LOSARTAN POTASSIUM 25 MG/1
TABLET ORAL
COMMUNITY

## 2023-11-22 RX ORDER — POTASSIUM CHLORIDE 750 MG/1
TABLET, FILM COATED, EXTENDED RELEASE ORAL
COMMUNITY
Start: 2019-08-06

## 2023-11-22 RX ORDER — ATORVASTATIN CALCIUM 20 MG/1
TABLET, FILM COATED ORAL
COMMUNITY
End: 2023-11-22 | Stop reason: ALTCHOICE

## 2023-11-22 ASSESSMENT — ENCOUNTER SYMPTOMS
DYSURIA: 0
JOINT SWELLING: 0
PALPITATIONS: 0
COUGH: 0
ARTHRALGIAS: 0
DIZZINESS: 0
WEAKNESS: 0
FATIGUE: 0
VOMITING: 0
SORE THROAT: 0
UNEXPECTED WEIGHT CHANGE: 0
ABDOMINAL PAIN: 0
CHEST TIGHTNESS: 0
SHORTNESS OF BREATH: 0
WHEEZING: 0
CONSTIPATION: 0
DIARRHEA: 0
SLEEP DISTURBANCE: 0
CHILLS: 0
CONFUSION: 0
ADENOPATHY: 0
NAUSEA: 0

## 2023-11-22 NOTE — ASSESSMENT & PLAN NOTE
On/off for many years, evaluated in the past by dermatology, no dx given, had bx, will need reevaluation.

## 2023-11-22 NOTE — PROGRESS NOTES
Subjective   Merced Allan is a 63 y.o. female who presents for Annual Exam and Follow-up (DM).  Patient is here for her Annual Physical Exam, CPE and to follow up on DM.  Last A1C was 6.0%  Today A1C is 6.0%  Mammogram 05.18.23  Colonoscopy 8/31/2022, q3 years  Has left shoulder pain and limitation of movement, can't lift it up for 4 month, has left arm numbness, using cream, helped some but stiil has symptoms.  Had tb test positive but didn't go to tb clinic, getting nodule subcutaneous , on/off, this time has redness on one of nodule on her left arm. Has this nodules for many years on/off staying for about 1 month then disappears, seen by dermatology , had bx too, no dx given      Review of Systems   Constitutional:  Negative for chills, fatigue and unexpected weight change.        Comment   HENT:  Negative for congestion, ear pain and sore throat.    Respiratory:  Negative for cough, chest tightness, shortness of breath and wheezing.    Cardiovascular:  Negative for palpitations and leg swelling.   Gastrointestinal:  Negative for abdominal pain, constipation, diarrhea, nausea and vomiting.   Genitourinary:  Negative for dysuria and urgency.   Musculoskeletal:  Negative for arthralgias and joint swelling.   Skin:  Negative for rash.   Neurological:  Negative for dizziness and weakness.   Hematological:  Negative for adenopathy.   Psychiatric/Behavioral:  Negative for confusion and sleep disturbance.    All other systems reviewed and are negative.  === 04/12/23 ===    CT CHEST WO IV CONTRAST    - Impression -  1. Unchanged few noncalcified lung nodules measuring up to 3 mm,  stable dating back to 05/25/2021, consistent with benign etiology. If  the patient fulfils the criteria for lung cancer screening, continued  annual non contrast CT scan of chest can be performed.(Naresh Garcia  et al., Guidelines for management of incidental pulmonary nodules  detected on CT images: From the Fleischner Society 2017,  Radiology.  2017 Jul;284 (1):228-243.) [place]OL[place]S FOLLOW UP.ACR.IF.1  2. Subtle ground-glass opacities within bilateral lungs,  predominantly involving the upper lobes, most consistent with changes  of respiratory bronchiolitis, overall similar to prior CT on  05/25/2021.  3. Diffuse thickened appearance of the esophagus slightly more  pronounced compared to prior studies, could be due to esophagitis.  Underlying neoplastic process would be less likely. Consider  correlation with endoscopy for further assessment.  4. Mildly prominent nonspecific mediastinal and paraesophageal and  axillary nodes.  5. Focus of increased density/stranding within the anteromedial right  breast as described could be sequela of mastitis or posttraumatic  change among other etiologies. Clinical correlation and follow-up  advised and consider dedicated mammogram/ultrasound for further  assessment.  6.  Additional findings as above.    I personally reviewed the images/study and I agree with the findings  as stated. This study was interpreted at [place], [place].    Objective   Physical Exam  Constitutional:       Appearance: Normal appearance.      Comments: Non tender , mobile nodules both arms, on left arm has erythema., limitation of movement of left shoulder.   HENT:      Head: Normocephalic and atraumatic.   Eyes:      Conjunctiva/sclera: Conjunctivae normal.   Neck:      Vascular: No carotid bruit.   Cardiovascular:      Rate and Rhythm: Normal rate and regular rhythm.      Heart sounds: No murmur heard.  Pulmonary:      Effort: No respiratory distress.      Breath sounds: No wheezing, rhonchi or rales.   Chest:      Chest wall: No tenderness.   Abdominal:      General: Bowel sounds are normal. There is no distension.      Palpations: Abdomen is soft. There is no mass.      Tenderness: There is no abdominal tenderness.   Musculoskeletal:         General: Normal range of motion.       "Cervical back: Neck supple.   Lymphadenopathy:      Cervical: No cervical adenopathy.   Skin:     Coloration: Skin is not jaundiced.      Findings: No rash.   Neurological:      General: No focal deficit present.      Mental Status: She is alert and oriented to person, place, and time. Mental status is at baseline.      Motor: No weakness.      Gait: Gait normal.   Psychiatric:         Mood and Affect: Mood normal.         Behavior: Behavior normal.         Judgment: Judgment normal.       /74 (BP Location: Left arm, Patient Position: Sitting)   Pulse 83   Temp 37.1 °C (98.8 °F)   Ht 1.626 m (5' 4\")   Wt 64.9 kg (143 lb)   SpO2 98%   BMI 24.55 kg/m²       Assessment/Plan   Problem List Items Addressed This Visit       Diabetes mellitus, type 2 (CMS/HCC)    Relevant Orders    POCT glycosylated hemoglobin (Hb A1C) manually resulted (Completed)    CBC and Auto Differential    TSH with reflex to Free T4 if abnormal    Urinalysis with Reflex Microscopic    Hepatitis C Antibody    Albumin , Urine Random    History of TB (tuberculosis)    Latent tuberculosis by blood test    Vitamin D deficiency    Vitamin B12 deficiency    Erythema nodosum    Relevant Orders    Referral to Dermatology    Annual physical exam - Primary    Relevant Orders    CBC and Auto Differential    TSH with reflex to Free T4 if abnormal    Urinalysis with Reflex Microscopic    Vitamin B12    Magnesium    Lipid Panel    Hepatitis C Antibody    Albumin , Urine Random    Vitamin D 25-Hydroxy,Total (for eval of Vitamin D levels)    T-Spot TB    Citrulline Antibody, IgG    C-Reactive Protein    Sedimentation Rate    Subcutaneous nodules     On/off for many years, evaluated in the past by dermatology, no dx given, had bx, will need reevaluation.         Relevant Orders    Referral to Dermatology    Left shoulder pain    Relevant Medications    meloxicam (Mobic) 7.5 mg tablet    Other Relevant Orders    XR shoulder left 2+ views    Referral to " Sports Medicine    Referral to Physical Therapy

## 2024-03-21 ENCOUNTER — LAB (OUTPATIENT)
Dept: LAB | Facility: LAB | Age: 64
End: 2024-03-21
Payer: COMMERCIAL

## 2024-03-21 DIAGNOSIS — Z00.00 ANNUAL PHYSICAL EXAM: ICD-10-CM

## 2024-03-21 DIAGNOSIS — I10 ESSENTIAL HYPERTENSION: ICD-10-CM

## 2024-03-21 DIAGNOSIS — E11.00 TYPE 2 DIABETES MELLITUS WITH HYPEROSMOLARITY WITHOUT COMA, WITHOUT LONG-TERM CURRENT USE OF INSULIN (MULTI): ICD-10-CM

## 2024-03-21 LAB
25(OH)D3 SERPL-MCNC: 26 NG/ML (ref 30–100)
ANION GAP SERPL CALC-SCNC: 10 MMOL/L (ref 10–20)
APPEARANCE UR: ABNORMAL
BACTERIA #/AREA URNS AUTO: ABNORMAL /HPF
BASOPHILS # BLD AUTO: 0.03 X10*3/UL (ref 0–0.1)
BASOPHILS NFR BLD AUTO: 0.4 %
BILIRUB UR STRIP.AUTO-MCNC: NEGATIVE MG/DL
BUN SERPL-MCNC: 15 MG/DL (ref 6–23)
CALCIUM SERPL-MCNC: 9 MG/DL (ref 8.6–10.3)
CCP IGG SERPL-ACNC: <1 U/ML
CHLORIDE SERPL-SCNC: 105 MMOL/L (ref 98–107)
CHOLEST SERPL-MCNC: 145 MG/DL (ref 0–199)
CHOLESTEROL/HDL RATIO: 5
CO2 SERPL-SCNC: 30 MMOL/L (ref 21–32)
COLOR UR: YELLOW
CREAT SERPL-MCNC: 0.72 MG/DL (ref 0.5–1.05)
CREAT UR-MCNC: 186.4 MG/DL (ref 20–320)
CRP SERPL-MCNC: <0.1 MG/DL
EGFRCR SERPLBLD CKD-EPI 2021: >90 ML/MIN/1.73M*2
EOSINOPHIL # BLD AUTO: 0.28 X10*3/UL (ref 0–0.7)
EOSINOPHIL NFR BLD AUTO: 3.4 %
ERYTHROCYTE [DISTWIDTH] IN BLOOD BY AUTOMATED COUNT: 13.1 % (ref 11.5–14.5)
ERYTHROCYTE [SEDIMENTATION RATE] IN BLOOD BY WESTERGREN METHOD: 10 MM/H (ref 0–30)
GLUCOSE SERPL-MCNC: 106 MG/DL (ref 74–99)
GLUCOSE UR STRIP.AUTO-MCNC: NEGATIVE MG/DL
HCT VFR BLD AUTO: 41.7 % (ref 36–46)
HCV AB SER QL: NONREACTIVE
HDLC SERPL-MCNC: 28.9 MG/DL
HGB BLD-MCNC: 13.6 G/DL (ref 12–16)
IMM GRANULOCYTES # BLD AUTO: 0.05 X10*3/UL (ref 0–0.7)
IMM GRANULOCYTES NFR BLD AUTO: 0.6 % (ref 0–0.9)
KETONES UR STRIP.AUTO-MCNC: NEGATIVE MG/DL
LDLC SERPL CALC-MCNC: 63 MG/DL
LEUKOCYTE ESTERASE UR QL STRIP.AUTO: ABNORMAL
LYMPHOCYTES # BLD AUTO: 2.67 X10*3/UL (ref 1.2–4.8)
LYMPHOCYTES NFR BLD AUTO: 32.4 %
MAGNESIUM SERPL-MCNC: 2.15 MG/DL (ref 1.6–2.4)
MCH RBC QN AUTO: 30.1 PG (ref 26–34)
MCHC RBC AUTO-ENTMCNC: 32.6 G/DL (ref 32–36)
MCV RBC AUTO: 92 FL (ref 80–100)
MICROALBUMIN UR-MCNC: 48.9 MG/L
MICROALBUMIN/CREAT UR: 26.2 UG/MG CREAT
MONOCYTES # BLD AUTO: 0.56 X10*3/UL (ref 0.1–1)
MONOCYTES NFR BLD AUTO: 6.8 %
MUCOUS THREADS #/AREA URNS AUTO: ABNORMAL /LPF
NEUTROPHILS # BLD AUTO: 4.65 X10*3/UL (ref 1.2–7.7)
NEUTROPHILS NFR BLD AUTO: 56.4 %
NITRITE UR QL STRIP.AUTO: NEGATIVE
NON HDL CHOLESTEROL: 116 MG/DL (ref 0–149)
NRBC BLD-RTO: 0 /100 WBCS (ref 0–0)
PH UR STRIP.AUTO: 5 [PH]
PLATELET # BLD AUTO: 255 X10*3/UL (ref 150–450)
POTASSIUM SERPL-SCNC: 4.1 MMOL/L (ref 3.5–5.3)
PROT UR STRIP.AUTO-MCNC: ABNORMAL MG/DL
RBC # BLD AUTO: 4.52 X10*6/UL (ref 4–5.2)
RBC # UR STRIP.AUTO: ABNORMAL /UL
RBC #/AREA URNS AUTO: ABNORMAL /HPF
SODIUM SERPL-SCNC: 141 MMOL/L (ref 136–145)
SP GR UR STRIP.AUTO: 1.02
SQUAMOUS #/AREA URNS AUTO: ABNORMAL /HPF
TRIGL SERPL-MCNC: 265 MG/DL (ref 0–149)
TSH SERPL-ACNC: 1.26 MIU/L (ref 0.44–3.98)
UROBILINOGEN UR STRIP.AUTO-MCNC: <2 MG/DL
VIT B12 SERPL-MCNC: 434 PG/ML (ref 211–911)
VLDL: 53 MG/DL (ref 0–40)
WBC # BLD AUTO: 8.2 X10*3/UL (ref 4.4–11.3)
WBC #/AREA URNS AUTO: >50 /HPF

## 2024-03-21 PROCEDURE — 36415 COLL VENOUS BLD VENIPUNCTURE: CPT

## 2024-03-21 PROCEDURE — 85025 COMPLETE CBC W/AUTO DIFF WBC: CPT

## 2024-03-21 PROCEDURE — 80061 LIPID PANEL: CPT

## 2024-03-21 PROCEDURE — 86140 C-REACTIVE PROTEIN: CPT

## 2024-03-21 PROCEDURE — 82043 UR ALBUMIN QUANTITATIVE: CPT

## 2024-03-21 PROCEDURE — 82607 VITAMIN B-12: CPT

## 2024-03-21 PROCEDURE — 84443 ASSAY THYROID STIM HORMONE: CPT

## 2024-03-21 PROCEDURE — 81001 URINALYSIS AUTO W/SCOPE: CPT

## 2024-03-21 PROCEDURE — 86803 HEPATITIS C AB TEST: CPT

## 2024-03-21 PROCEDURE — 82306 VITAMIN D 25 HYDROXY: CPT

## 2024-03-21 PROCEDURE — 86200 CCP ANTIBODY: CPT

## 2024-03-21 PROCEDURE — 83735 ASSAY OF MAGNESIUM: CPT

## 2024-03-21 PROCEDURE — 82570 ASSAY OF URINE CREATININE: CPT

## 2024-03-21 PROCEDURE — 80048 BASIC METABOLIC PNL TOTAL CA: CPT

## 2024-03-21 PROCEDURE — 86481 TB AG RESPONSE T-CELL SUSP: CPT

## 2024-03-21 PROCEDURE — 85652 RBC SED RATE AUTOMATED: CPT

## 2024-03-23 LAB
NIL(NEG) CONTROL SPOT COUNT: ABNORMAL
PANEL A SPOT COUNT: 13
PANEL B SPOT COUNT: 29
POS CONTROL SPOT COUNT: ABNORMAL
T-SPOT. TB INTERPRETATION: POSITIVE

## 2024-04-03 ENCOUNTER — OFFICE VISIT (OUTPATIENT)
Dept: PRIMARY CARE | Facility: CLINIC | Age: 64
End: 2024-04-03
Payer: COMMERCIAL

## 2024-04-03 VITALS
WEIGHT: 142.2 LBS | TEMPERATURE: 98.2 F | DIASTOLIC BLOOD PRESSURE: 72 MMHG | SYSTOLIC BLOOD PRESSURE: 124 MMHG | OXYGEN SATURATION: 98 % | RESPIRATION RATE: 16 BRPM | HEART RATE: 72 BPM | HEIGHT: 64 IN | BODY MASS INDEX: 24.28 KG/M2

## 2024-04-03 DIAGNOSIS — I10 ESSENTIAL HYPERTENSION: ICD-10-CM

## 2024-04-03 DIAGNOSIS — E55.9 VITAMIN D DEFICIENCY: ICD-10-CM

## 2024-04-03 DIAGNOSIS — E78.5 DYSLIPIDEMIA: ICD-10-CM

## 2024-04-03 DIAGNOSIS — F17.210 CIGARETTE NICOTINE DEPENDENCE WITHOUT COMPLICATION: ICD-10-CM

## 2024-04-03 DIAGNOSIS — Z71.6 ENCOUNTER FOR SMOKING CESSATION COUNSELING: ICD-10-CM

## 2024-04-03 DIAGNOSIS — E11.00 TYPE 2 DIABETES MELLITUS WITH HYPEROSMOLARITY WITHOUT COMA, WITHOUT LONG-TERM CURRENT USE OF INSULIN (MULTI): Primary | ICD-10-CM

## 2024-04-03 LAB — POC HEMOGLOBIN A1C: 5.8 % (ref 4.2–6.5)

## 2024-04-03 PROCEDURE — 3078F DIAST BP <80 MM HG: CPT | Performed by: INTERNAL MEDICINE

## 2024-04-03 PROCEDURE — 3060F POS MICROALBUMINURIA REV: CPT | Performed by: INTERNAL MEDICINE

## 2024-04-03 PROCEDURE — 83036 HEMOGLOBIN GLYCOSYLATED A1C: CPT | Performed by: INTERNAL MEDICINE

## 2024-04-03 PROCEDURE — 99214 OFFICE O/P EST MOD 30 MIN: CPT | Performed by: INTERNAL MEDICINE

## 2024-04-03 PROCEDURE — 3048F LDL-C <100 MG/DL: CPT | Performed by: INTERNAL MEDICINE

## 2024-04-03 PROCEDURE — 3074F SYST BP LT 130 MM HG: CPT | Performed by: INTERNAL MEDICINE

## 2024-04-03 PROCEDURE — 4010F ACE/ARB THERAPY RXD/TAKEN: CPT | Performed by: INTERNAL MEDICINE

## 2024-04-03 RX ORDER — NICOTINE 7MG/24HR
1 PATCH, TRANSDERMAL 24 HOURS TRANSDERMAL EVERY 24 HOURS
Qty: 28 PATCH | Refills: 0 | Status: SHIPPED | OUTPATIENT
Start: 2024-04-03 | End: 2024-04-10 | Stop reason: SDUPTHER

## 2024-04-03 RX ORDER — METOPROLOL SUCCINATE 25 MG/1
25 TABLET, EXTENDED RELEASE ORAL DAILY
COMMUNITY
Start: 2024-03-01

## 2024-04-03 ASSESSMENT — ENCOUNTER SYMPTOMS
FATIGUE: 0
UNEXPECTED WEIGHT CHANGE: 0
WHEEZING: 0
SLEEP DISTURBANCE: 0
COUGH: 0
SORE THROAT: 0
DIARRHEA: 0
DYSURIA: 0
CHILLS: 0
PALPITATIONS: 0
ABDOMINAL PAIN: 0
VOMITING: 0
SHORTNESS OF BREATH: 0
DIZZINESS: 0
WEAKNESS: 0
NAUSEA: 0
CONFUSION: 0
ADENOPATHY: 0
CONSTIPATION: 0
ARTHRALGIAS: 0
JOINT SWELLING: 0
CHEST TIGHTNESS: 0

## 2024-04-03 NOTE — ASSESSMENT & PLAN NOTE
I have discussed the cardiovascular risk and behavioral modification, nutrition, exercise and elimination of habits contributing to risk. We agreed to a plan how to reduce the risk.  CV risk estimate calculates 22.5 %  Refusing statines

## 2024-04-03 NOTE — PROGRESS NOTES
Subjective   Merced Allan is a 63 y.o. female who presents for Follow-up and Diabetes.  Patient is here to Follow up DM.  Last A1C was 6.0% on 11.22.23. Today A1c is 5.8%  Patient would like to discuss about her triglycerides.  Labs done 03.21.24.  Labs and test reviewed with patient , all question answered, further evaluation, if needed , discussed.  Taking curalin , a natural product, not on other tx.  Seen by TB clinic , no TB, had bx of subcutaneous nodules , all was ok.    Diabetes  She presents for her follow-up diabetic visit. She has type 2 diabetes mellitus. Pertinent negatives for hypoglycemia include no confusion or dizziness. Pertinent negatives for diabetes include no fatigue and no weakness.     Review of Systems   Constitutional:  Negative for chills, fatigue and unexpected weight change.        Comment   HENT:  Negative for congestion, ear pain and sore throat.    Respiratory:  Negative for cough, chest tightness, shortness of breath and wheezing.    Cardiovascular:  Negative for palpitations and leg swelling.   Gastrointestinal:  Negative for abdominal pain, constipation, diarrhea, nausea and vomiting.   Genitourinary:  Negative for dysuria and urgency.   Musculoskeletal:  Negative for arthralgias and joint swelling.   Skin:  Negative for rash.   Neurological:  Negative for dizziness and weakness.   Hematological:  Negative for adenopathy.   Psychiatric/Behavioral:  Negative for confusion and sleep disturbance.    All other systems reviewed and are negative.      Objective   Physical Exam  Constitutional:       Appearance: Normal appearance.   HENT:      Head: Normocephalic and atraumatic.   Eyes:      Pupils: Pupils are equal, round, and reactive to light.   Cardiovascular:      Rate and Rhythm: Normal rate and regular rhythm.   Pulmonary:      Effort: Pulmonary effort is normal.      Breath sounds: Normal breath sounds.   Musculoskeletal:         General: Normal range of motion.      Cervical  "back: Normal range of motion and neck supple.   Skin:     General: Skin is warm.   Neurological:      General: No focal deficit present.      Mental Status: She is alert and oriented to person, place, and time.   Psychiatric:         Mood and Affect: Mood normal.         Behavior: Behavior normal.       /72 (BP Location: Left arm, Patient Position: Sitting)   Pulse 72   Temp 36.8 °C (98.2 °F)   Resp 16   Ht 1.626 m (5' 4\")   Wt 64.5 kg (142 lb 3.2 oz)   SpO2 98%   BMI 24.41 kg/m²     Lab Results   Component Value Date    HGBA1C 5.8 04/03/2024     Lab Results   Component Value Date    WBC 8.2 03/21/2024    HGB 13.6 03/21/2024    HCT 41.7 03/21/2024    MCV 92 03/21/2024     03/21/2024     Lab Results   Component Value Date    GLUCOSE 106 (H) 03/21/2024    CALCIUM 9.0 03/21/2024     03/21/2024    K 4.1 03/21/2024    CO2 30 03/21/2024     03/21/2024    BUN 15 03/21/2024    CREATININE 0.72 03/21/2024     Lab Results   Component Value Date    CHOL 145 03/21/2024    CHOL 137 07/06/2022    CHOL 153 05/27/2021     Lab Results   Component Value Date    HDL 28.9 03/21/2024    HDL 27.0 (A) 07/06/2022    HDL 33.0 (A) 05/27/2021     Lab Results   Component Value Date    LDLCALC 63 03/21/2024     Lab Results   Component Value Date    TRIG 265 (H) 03/21/2024    TRIG 167 (H) 07/06/2022    TRIG 145 05/27/2021     No components found for: \"CHOLHDL\"    Assessment/Plan   Problem List Items Addressed This Visit       Dyslipidemia     I have discussed the cardiovascular risk and behavioral modification, nutrition, exercise and elimination of habits contributing to risk. We agreed to a plan how to reduce the risk.  CV risk estimate calculates 22.5 %  Refusing statines           Diabetes mellitus, type 2 (CMS/HCC) - Primary    Relevant Orders    POCT glycosylated hemoglobin (Hb A1C) manually resulted (Completed)    Nicotine dependence    Relevant Medications    nicotine (Nicoderm CQ) 7 mg/24 hr patch    " Vitamin D deficiency    Essential hypertension    Encounter for smoking cessation counseling

## 2024-04-08 DIAGNOSIS — F32.0 MAJOR DEPRESSIVE DISORDER, SINGLE EPISODE, MILD (CMS-HCC): ICD-10-CM

## 2024-04-08 DIAGNOSIS — F17.210 CIGARETTE NICOTINE DEPENDENCE WITHOUT COMPLICATION: ICD-10-CM

## 2024-04-10 RX ORDER — NICOTINE 7MG/24HR
1 PATCH, TRANSDERMAL 24 HOURS TRANSDERMAL EVERY 24 HOURS
Qty: 28 PATCH | Refills: 0 | Status: SHIPPED | OUTPATIENT
Start: 2024-04-10 | End: 2024-05-08

## 2024-04-10 RX ORDER — ESCITALOPRAM OXALATE 10 MG/1
TABLET ORAL
Qty: 90 TABLET | Refills: 1 | Status: SHIPPED | OUTPATIENT
Start: 2024-04-10

## 2024-04-28 DIAGNOSIS — E11.9 TYPE 2 DIABETES MELLITUS WITHOUT COMPLICATIONS (MULTI): ICD-10-CM

## 2024-04-29 ENCOUNTER — TELEPHONE (OUTPATIENT)
Dept: PRIMARY CARE | Facility: CLINIC | Age: 64
End: 2024-04-29
Payer: COMMERCIAL

## 2024-04-29 DIAGNOSIS — E11.9 TYPE 2 DIABETES MELLITUS WITHOUT COMPLICATIONS (MULTI): Primary | ICD-10-CM

## 2024-04-29 RX ORDER — BLOOD-GLUCOSE METER
EACH MISCELLANEOUS
Qty: 100 STRIP | Refills: 3 | Status: SHIPPED | OUTPATIENT
Start: 2024-04-29

## 2024-04-29 RX ORDER — BLOOD-GLUCOSE METER
EACH MISCELLANEOUS
Qty: 100 STRIP | Refills: 1 | Status: SHIPPED | OUTPATIENT
Start: 2024-04-29 | End: 2024-04-29 | Stop reason: SDUPTHER

## 2024-05-16 ENCOUNTER — TELEPHONE (OUTPATIENT)
Dept: PRIMARY CARE | Facility: CLINIC | Age: 64
End: 2024-05-16
Payer: COMMERCIAL

## 2024-05-16 DIAGNOSIS — I10 ESSENTIAL (PRIMARY) HYPERTENSION: Primary | ICD-10-CM

## 2024-05-16 RX ORDER — AMLODIPINE BESYLATE 5 MG/1
TABLET ORAL
Qty: 90 TABLET | Refills: 0 | Status: SHIPPED | OUTPATIENT
Start: 2024-05-16

## 2024-06-27 ENCOUNTER — OFFICE VISIT (OUTPATIENT)
Dept: URGENT CARE | Facility: CLINIC | Age: 64
End: 2024-06-27
Payer: COMMERCIAL

## 2024-06-27 VITALS
RESPIRATION RATE: 18 BRPM | WEIGHT: 139.7 LBS | BODY MASS INDEX: 23.98 KG/M2 | OXYGEN SATURATION: 96 % | DIASTOLIC BLOOD PRESSURE: 79 MMHG | SYSTOLIC BLOOD PRESSURE: 156 MMHG | HEART RATE: 70 BPM | TEMPERATURE: 97.6 F

## 2024-06-27 DIAGNOSIS — R11.0 NAUSEA: Primary | ICD-10-CM

## 2024-06-27 PROCEDURE — 3077F SYST BP >= 140 MM HG: CPT | Performed by: PHYSICIAN ASSISTANT

## 2024-06-27 PROCEDURE — 3048F LDL-C <100 MG/DL: CPT | Performed by: PHYSICIAN ASSISTANT

## 2024-06-27 PROCEDURE — 99203 OFFICE O/P NEW LOW 30 MIN: CPT | Performed by: PHYSICIAN ASSISTANT

## 2024-06-27 PROCEDURE — 3078F DIAST BP <80 MM HG: CPT | Performed by: PHYSICIAN ASSISTANT

## 2024-06-27 PROCEDURE — 4010F ACE/ARB THERAPY RXD/TAKEN: CPT | Performed by: PHYSICIAN ASSISTANT

## 2024-06-27 PROCEDURE — 3060F POS MICROALBUMINURIA REV: CPT | Performed by: PHYSICIAN ASSISTANT

## 2024-06-27 RX ORDER — ONDANSETRON 4 MG/1
4 TABLET, FILM COATED ORAL EVERY 8 HOURS PRN
Qty: 12 TABLET | Refills: 0 | Status: SHIPPED | OUTPATIENT
Start: 2024-06-27 | End: 2024-07-01

## 2024-06-27 ASSESSMENT — PAIN SCALES - GENERAL: PAINLEVEL: 5

## 2024-06-27 NOTE — PROGRESS NOTES
Subjective   Patient ID: Merced Allan is a 64 y.o. female who presents for Abdominal Pain (1day).  Patient is here for complaints of abdominal pain last night that since resolved and nausea.  Notes that for the last few days she has had diarrhea as well but this is now resolved.  She is bringing in her grandson for conjunctivitis but since she was here states that she would also like to be seen for her improving complaints.  Denies any fevers or chills.  No episodes of vomiting but persistent nausea since last night.  No chest pain or shortness of breath    No past medical history on file.      The remainder of the systems were reviewed and are negative unless noted above      Objective   /79   Pulse 70   Temp 36.4 °C (97.6 °F)   Resp 18   Wt 63.4 kg (139 lb 11.2 oz)   SpO2 96%   BMI 23.98 kg/m²   Physical Exam  Constitutional:       General: She is not in acute distress.     Appearance: Normal appearance. She is not ill-appearing, toxic-appearing or diaphoretic.   HENT:      Head: Normocephalic and atraumatic.      Mouth/Throat:      Mouth: Mucous membranes are moist.      Pharynx: Oropharynx is clear.   Eyes:      Conjunctiva/sclera: Conjunctivae normal.   Cardiovascular:      Rate and Rhythm: Normal rate and regular rhythm.      Heart sounds: No murmur heard.  Pulmonary:      Effort: Pulmonary effort is normal. No respiratory distress.      Breath sounds: Normal breath sounds. No wheezing.   Abdominal:      General: Abdomen is flat. Bowel sounds are normal.      Palpations: Abdomen is soft.      Tenderness: There is no abdominal tenderness. There is no guarding or rebound.   Musculoskeletal:         General: No swelling, tenderness, deformity or signs of injury. Normal range of motion.      Cervical back: Normal range of motion and neck supple.   Skin:     General: Skin is warm and dry.      Findings: No erythema or rash.   Neurological:      General: No focal deficit present.      Mental Status:  She is alert and oriented to person, place, and time.      Gait: Gait normal.         Assessment/Plan   Problem List Items Addressed This Visit       Nausea - Primary    Relevant Medications    ondansetron (Zofran) 4 mg tablet      Based upon the patient's history and physical exam findings likely self-limiting and improving gastroenteritis.  No significant tenderness on exam though the patient is noting some pain last night in the epigastrium.  No episodes of vomiting but persistent nausea.  I am sending Zofran to help with the symptoms recommending a bland diet for the next several days, focus on fluid rehydration

## 2024-06-27 NOTE — PATIENT INSTRUCTIONS
Armani  Assessment/Plan   Problem List Items Addressed This Visit       Nausea - Primary    Relevant Medications    ondansetron (Zofran) 4 mg tablet      Based upon the patient's history and physical exam findings likely self-limiting and improving gastroenteritis.  No significant tenderness on exam though the patient is noting some pain last night in the epigastrium.  No episodes of vomiting but persistent nausea.  I am sending Zofran to help with the symptoms recommending a bland diet for the next several days, focus on fluid rehydration

## 2024-06-28 ENCOUNTER — HOSPITAL ENCOUNTER (EMERGENCY)
Facility: HOSPITAL | Age: 64
Discharge: HOME | End: 2024-06-28
Attending: STUDENT IN AN ORGANIZED HEALTH CARE EDUCATION/TRAINING PROGRAM
Payer: COMMERCIAL

## 2024-06-28 ENCOUNTER — APPOINTMENT (OUTPATIENT)
Dept: RADIOLOGY | Facility: HOSPITAL | Age: 64
End: 2024-06-28
Payer: COMMERCIAL

## 2024-06-28 ENCOUNTER — APPOINTMENT (OUTPATIENT)
Dept: CARDIOLOGY | Facility: HOSPITAL | Age: 64
End: 2024-06-28
Payer: COMMERCIAL

## 2024-06-28 VITALS
DIASTOLIC BLOOD PRESSURE: 86 MMHG | OXYGEN SATURATION: 98 % | WEIGHT: 139 LBS | HEART RATE: 66 BPM | BODY MASS INDEX: 24.63 KG/M2 | SYSTOLIC BLOOD PRESSURE: 145 MMHG | RESPIRATION RATE: 18 BRPM | TEMPERATURE: 98.1 F | HEIGHT: 63 IN

## 2024-06-28 DIAGNOSIS — K83.8 COMMON BILE DUCT DILATION: ICD-10-CM

## 2024-06-28 DIAGNOSIS — K85.90 ACUTE PANCREATITIS WITHOUT INFECTION OR NECROSIS, UNSPECIFIED PANCREATITIS TYPE (HHS-HCC): Primary | ICD-10-CM

## 2024-06-28 LAB
ALBUMIN SERPL BCP-MCNC: 4.7 G/DL (ref 3.4–5)
ALP SERPL-CCNC: 61 U/L (ref 33–136)
ALT SERPL W P-5'-P-CCNC: 16 U/L (ref 7–45)
ANION GAP SERPL CALC-SCNC: 14 MMOL/L (ref 10–20)
APPEARANCE UR: CLEAR
AST SERPL W P-5'-P-CCNC: 12 U/L (ref 9–39)
ATRIAL RATE: 66 BPM
BASOPHILS # BLD AUTO: 0.03 X10*3/UL (ref 0–0.1)
BASOPHILS NFR BLD AUTO: 0.2 %
BILIRUB SERPL-MCNC: 0.5 MG/DL (ref 0–1.2)
BILIRUB UR STRIP.AUTO-MCNC: NEGATIVE MG/DL
BUN SERPL-MCNC: 14 MG/DL (ref 6–23)
CALCIUM SERPL-MCNC: 9.4 MG/DL (ref 8.6–10.3)
CARDIAC TROPONIN I PNL SERPL HS: 4 NG/L (ref 0–13)
CARDIAC TROPONIN I PNL SERPL HS: 4 NG/L (ref 0–13)
CHLORIDE SERPL-SCNC: 102 MMOL/L (ref 98–107)
CO2 SERPL-SCNC: 28 MMOL/L (ref 21–32)
COLOR UR: ABNORMAL
CREAT SERPL-MCNC: 0.77 MG/DL (ref 0.5–1.05)
EGFRCR SERPLBLD CKD-EPI 2021: 86 ML/MIN/1.73M*2
EOSINOPHIL # BLD AUTO: 0.2 X10*3/UL (ref 0–0.7)
EOSINOPHIL NFR BLD AUTO: 1.6 %
ERYTHROCYTE [DISTWIDTH] IN BLOOD BY AUTOMATED COUNT: 12.8 % (ref 11.5–14.5)
GLUCOSE SERPL-MCNC: 142 MG/DL (ref 74–99)
GLUCOSE UR STRIP.AUTO-MCNC: NORMAL MG/DL
HCT VFR BLD AUTO: 44 % (ref 36–46)
HGB BLD-MCNC: 14.7 G/DL (ref 12–16)
HOLD SPECIMEN: NORMAL
IMM GRANULOCYTES # BLD AUTO: 0.06 X10*3/UL (ref 0–0.7)
IMM GRANULOCYTES NFR BLD AUTO: 0.5 % (ref 0–0.9)
KETONES UR STRIP.AUTO-MCNC: NEGATIVE MG/DL
LACTATE SERPL-SCNC: 1.2 MMOL/L (ref 0.4–2)
LEUKOCYTE ESTERASE UR QL STRIP.AUTO: ABNORMAL
LIPASE SERPL-CCNC: 74 U/L (ref 9–82)
LYMPHOCYTES # BLD AUTO: 3.21 X10*3/UL (ref 1.2–4.8)
LYMPHOCYTES NFR BLD AUTO: 25.6 %
MCH RBC QN AUTO: 29.6 PG (ref 26–34)
MCHC RBC AUTO-ENTMCNC: 33.4 G/DL (ref 32–36)
MCV RBC AUTO: 89 FL (ref 80–100)
MONOCYTES # BLD AUTO: 0.88 X10*3/UL (ref 0.1–1)
MONOCYTES NFR BLD AUTO: 7 %
MUCOUS THREADS #/AREA URNS AUTO: NORMAL /LPF
NEUTROPHILS # BLD AUTO: 8.16 X10*3/UL (ref 1.2–7.7)
NEUTROPHILS NFR BLD AUTO: 65.1 %
NITRITE UR QL STRIP.AUTO: NEGATIVE
NRBC BLD-RTO: 0 /100 WBCS (ref 0–0)
P AXIS: 25 DEGREES
P OFFSET: 194 MS
P ONSET: 149 MS
PH UR STRIP.AUTO: 6.5 [PH]
PLATELET # BLD AUTO: 256 X10*3/UL (ref 150–450)
POTASSIUM SERPL-SCNC: 3.8 MMOL/L (ref 3.5–5.3)
PR INTERVAL: 134 MS
PROT SERPL-MCNC: 8 G/DL (ref 6.4–8.2)
PROT UR STRIP.AUTO-MCNC: ABNORMAL MG/DL
Q ONSET: 216 MS
QRS COUNT: 10 BEATS
QRS DURATION: 82 MS
QT INTERVAL: 416 MS
QTC CALCULATION(BAZETT): 436 MS
QTC FREDERICIA: 429 MS
R AXIS: 6 DEGREES
RBC # BLD AUTO: 4.96 X10*6/UL (ref 4–5.2)
RBC # UR STRIP.AUTO: ABNORMAL /UL
RBC #/AREA URNS AUTO: NORMAL /HPF
SODIUM SERPL-SCNC: 140 MMOL/L (ref 136–145)
SP GR UR STRIP.AUTO: 1.04
SQUAMOUS #/AREA URNS AUTO: NORMAL /HPF
T AXIS: 57 DEGREES
T OFFSET: 424 MS
UROBILINOGEN UR STRIP.AUTO-MCNC: NORMAL MG/DL
VENTRICULAR RATE: 66 BPM
WBC # BLD AUTO: 12.5 X10*3/UL (ref 4.4–11.3)
WBC #/AREA URNS AUTO: NORMAL /HPF

## 2024-06-28 PROCEDURE — 84484 ASSAY OF TROPONIN QUANT: CPT | Performed by: STUDENT IN AN ORGANIZED HEALTH CARE EDUCATION/TRAINING PROGRAM

## 2024-06-28 PROCEDURE — 36415 COLL VENOUS BLD VENIPUNCTURE: CPT | Performed by: STUDENT IN AN ORGANIZED HEALTH CARE EDUCATION/TRAINING PROGRAM

## 2024-06-28 PROCEDURE — 96361 HYDRATE IV INFUSION ADD-ON: CPT | Performed by: STUDENT IN AN ORGANIZED HEALTH CARE EDUCATION/TRAINING PROGRAM

## 2024-06-28 PROCEDURE — 81001 URINALYSIS AUTO W/SCOPE: CPT | Performed by: STUDENT IN AN ORGANIZED HEALTH CARE EDUCATION/TRAINING PROGRAM

## 2024-06-28 PROCEDURE — 87086 URINE CULTURE/COLONY COUNT: CPT | Mod: STJLAB | Performed by: STUDENT IN AN ORGANIZED HEALTH CARE EDUCATION/TRAINING PROGRAM

## 2024-06-28 PROCEDURE — 85025 COMPLETE CBC W/AUTO DIFF WBC: CPT | Performed by: STUDENT IN AN ORGANIZED HEALTH CARE EDUCATION/TRAINING PROGRAM

## 2024-06-28 PROCEDURE — 2500000004 HC RX 250 GENERAL PHARMACY W/ HCPCS (ALT 636 FOR OP/ED): Performed by: STUDENT IN AN ORGANIZED HEALTH CARE EDUCATION/TRAINING PROGRAM

## 2024-06-28 PROCEDURE — 83690 ASSAY OF LIPASE: CPT | Performed by: STUDENT IN AN ORGANIZED HEALTH CARE EDUCATION/TRAINING PROGRAM

## 2024-06-28 PROCEDURE — 99285 EMERGENCY DEPT VISIT HI MDM: CPT | Performed by: STUDENT IN AN ORGANIZED HEALTH CARE EDUCATION/TRAINING PROGRAM

## 2024-06-28 PROCEDURE — 83605 ASSAY OF LACTIC ACID: CPT | Performed by: STUDENT IN AN ORGANIZED HEALTH CARE EDUCATION/TRAINING PROGRAM

## 2024-06-28 PROCEDURE — 96374 THER/PROPH/DIAG INJ IV PUSH: CPT | Performed by: STUDENT IN AN ORGANIZED HEALTH CARE EDUCATION/TRAINING PROGRAM

## 2024-06-28 PROCEDURE — 93005 ELECTROCARDIOGRAM TRACING: CPT

## 2024-06-28 PROCEDURE — 74177 CT ABD & PELVIS W/CONTRAST: CPT

## 2024-06-28 PROCEDURE — 74177 CT ABD & PELVIS W/CONTRAST: CPT | Mod: FOREIGN READ | Performed by: RADIOLOGY

## 2024-06-28 PROCEDURE — 2550000001 HC RX 255 CONTRASTS: Performed by: STUDENT IN AN ORGANIZED HEALTH CARE EDUCATION/TRAINING PROGRAM

## 2024-06-28 PROCEDURE — 96375 TX/PRO/DX INJ NEW DRUG ADDON: CPT | Performed by: STUDENT IN AN ORGANIZED HEALTH CARE EDUCATION/TRAINING PROGRAM

## 2024-06-28 PROCEDURE — 84075 ASSAY ALKALINE PHOSPHATASE: CPT | Performed by: STUDENT IN AN ORGANIZED HEALTH CARE EDUCATION/TRAINING PROGRAM

## 2024-06-28 RX ORDER — OXYCODONE HYDROCHLORIDE 5 MG/1
5 TABLET ORAL EVERY 6 HOURS PRN
Qty: 12 TABLET | Refills: 0 | Status: SHIPPED | OUTPATIENT
Start: 2024-06-28 | End: 2024-07-01

## 2024-06-28 RX ORDER — MORPHINE SULFATE 4 MG/ML
4 INJECTION, SOLUTION INTRAMUSCULAR; INTRAVENOUS ONCE
Status: COMPLETED | OUTPATIENT
Start: 2024-06-28 | End: 2024-06-28

## 2024-06-28 RX ORDER — ONDANSETRON HYDROCHLORIDE 2 MG/ML
4 INJECTION, SOLUTION INTRAVENOUS ONCE
Status: COMPLETED | OUTPATIENT
Start: 2024-06-28 | End: 2024-06-28

## 2024-06-28 RX ADMIN — MORPHINE SULFATE 4 MG: 4 INJECTION, SOLUTION INTRAMUSCULAR; INTRAVENOUS at 08:48

## 2024-06-28 RX ADMIN — ONDANSETRON 4 MG: 2 INJECTION INTRAMUSCULAR; INTRAVENOUS at 08:48

## 2024-06-28 RX ADMIN — IOHEXOL 75 ML: 350 INJECTION, SOLUTION INTRAVENOUS at 09:21

## 2024-06-28 RX ADMIN — SODIUM CHLORIDE, POTASSIUM CHLORIDE, SODIUM LACTATE AND CALCIUM CHLORIDE 1000 ML: 600; 310; 30; 20 INJECTION, SOLUTION INTRAVENOUS at 09:54

## 2024-06-28 ASSESSMENT — PAIN SCALES - GENERAL
PAINLEVEL_OUTOF10: 0 - NO PAIN
PAINLEVEL_OUTOF10: 2
PAINLEVEL_OUTOF10: 0 - NO PAIN
PAINLEVEL_OUTOF10: 0 - NO PAIN
PAINLEVEL_OUTOF10: 5 - MODERATE PAIN
PAINLEVEL_OUTOF10: 0 - NO PAIN

## 2024-06-28 ASSESSMENT — PAIN DESCRIPTION - ORIENTATION: ORIENTATION: RIGHT;UPPER

## 2024-06-28 ASSESSMENT — PAIN DESCRIPTION - DESCRIPTORS
DESCRIPTORS: ACHING
DESCRIPTORS: ACHING

## 2024-06-28 ASSESSMENT — LIFESTYLE VARIABLES
HAVE YOU EVER FELT YOU SHOULD CUT DOWN ON YOUR DRINKING: NO
TOTAL SCORE: 0
HAVE PEOPLE ANNOYED YOU BY CRITICIZING YOUR DRINKING: NO
EVER FELT BAD OR GUILTY ABOUT YOUR DRINKING: NO
EVER HAD A DRINK FIRST THING IN THE MORNING TO STEADY YOUR NERVES TO GET RID OF A HANGOVER: NO

## 2024-06-28 ASSESSMENT — COLUMBIA-SUICIDE SEVERITY RATING SCALE - C-SSRS
6. HAVE YOU EVER DONE ANYTHING, STARTED TO DO ANYTHING, OR PREPARED TO DO ANYTHING TO END YOUR LIFE?: NO
2. HAVE YOU ACTUALLY HAD ANY THOUGHTS OF KILLING YOURSELF?: NO
1. IN THE PAST MONTH, HAVE YOU WISHED YOU WERE DEAD OR WISHED YOU COULD GO TO SLEEP AND NOT WAKE UP?: NO

## 2024-06-28 ASSESSMENT — PAIN DESCRIPTION - PAIN TYPE: TYPE: ACUTE PAIN

## 2024-06-28 ASSESSMENT — PAIN DESCRIPTION - FREQUENCY: FREQUENCY: INTERMITTENT

## 2024-06-28 ASSESSMENT — PAIN - FUNCTIONAL ASSESSMENT
PAIN_FUNCTIONAL_ASSESSMENT: 0-10
PAIN_FUNCTIONAL_ASSESSMENT: 0-10

## 2024-06-28 ASSESSMENT — PAIN DESCRIPTION - LOCATION: LOCATION: ABDOMEN

## 2024-06-28 NOTE — DISCHARGE INSTRUCTIONS
In addition to the mild inflammation surrounding the pancreas, you have some dilation of the bile duct which may be normal for you since the removal of your gallbladder, however if you would like to follow-up outpatient with gastroenterology if you continue having symptoms, you may follow-up with the team listed above  If you develop fevers, if you have nausea and vomiting that prevents you from keeping fluids down, if you notice any blood in your urine or stool, or if you develop difficulty breathing, or chest pain, or for any additional concerns, return to the emergency department for evaluation.  You can alternate tylenol and ibuprofen every 4 hours as needed for pain. The goal is not to take your pain away completely, but to make your pain manageable. if you are still unable to manage your pain at an acceptable level with ibuprofen and tylenol, you may use the oxycodone I have sent to your pharmacy.

## 2024-06-28 NOTE — ED PROVIDER NOTES
EMERGENCY DEPARTMENT ENCOUNTER      Pt Name: Merced Allan  MRN: [id number]  Birthdate 1960  Date of evaluation: 6/28/2024  Provider: Alda Dykes DO    CHIEF COMPLAINT       Chief Complaint   Patient presents with    Abdominal Pain     RUQ pain for about 1 week with nausea and diarrhea.         HISTORY OF PRESENT ILLNESS    HPI   64-year-old female who presents to the emergency department with 3 days of right upper quadrant abdominal pain with nausea, it has been going on for several days, seen at urgent care yesterday continues to have symptoms.  She has not vomited.  She had no fevers.  No chest pains or difficulty breathing.  She endorses a longstanding diarrhea, that was particularly bad about a week ago.  Additionally has concerns for urinary tract infection and she states that sometimes it is difficult for her to fully empty her bladder without bearing down.  Endorses a history of anxiety that is been difficult for her to control but she is following with her primary care for this          Nursing Notes were reviewed.       PAST MEDICAL HISTORY   Patient History   No past medical history on file.      SURGICAL HISTORY       Past Surgical History:   Procedure Laterality Date    OTHER SURGICAL HISTORY  08/12/2019    Surgery    OTHER SURGICAL HISTORY  08/12/2019    Shoulder surgery    OTHER SURGICAL HISTORY  08/12/2019    Appendectomy    OTHER SURGICAL HISTORY  08/12/2019    Colposcopy    OTHER SURGICAL HISTORY  08/12/2019    Cholecystectomy    OTHER SURGICAL HISTORY  08/12/2019    Hysterectomy         CURRENT MEDICATIONS       Discharge Medication List as of 6/28/2024 10:39 AM        CONTINUE these medications which have NOT CHANGED    Details   amLODIPine (Norvasc) 5 mg tablet TAKE 1 TABLET BY MOUTH EVERY DAY, Normal      atorvastatin (Lipitor) 20 mg tablet Take 1 tablet (20 mg) by mouth once daily., Historical Med      azelastine (Astelin) 137 mcg (0.1 %) nasal spray Administer into affected  nostril(s) once daily., Starting Mon 2/1/2021, Historical Med      blood sugar diagnostic (OneTouch Verio test strips) strip USE TO TEST ONCE DAILY, Normal      cholecalciferol (Vitamin D-3) 25 MCG (1000 UT) tablet Take by mouth., Historical Med      cyanocobalamin (Vitamin B-12) 1,000 mcg tablet Take by mouth., Historical Med      diclofenac sodium (Voltaren) 1 % gel gel Apply 1 Application topically 4 times a day., Starting Fri 8/18/2023, Normal      escitalopram (Lexapro) 10 mg tablet TAKE 1 TABLET BY MOUTH EVERY DAY, Normal      fish oil concentrate (Omega-3) 120-180 mg capsule Take by mouth., Historical Med      FLAXSEED ORAL Take by mouth., Historical Med      fluticasone (Veramyst) 27.5 mcg/actuation nasal spray Administer into affected nostril(s) once daily., Starting Tue 8/27/2019, Historical Med      ibuprofen 400 mg tablet 1 Tablet, Historical Med      lansoprazole (Prevacid) 30 mg DR capsule Take by mouth., Historical Med      lidocaine (Lidoderm) 5 % patch Place 1 patch over 12 hours on the skin once daily. Apply to painful area 12 hours per day, remove for 12 hours., Starting Fri 8/18/2023, Until Sat 8/17/2024, Normal      losartan (Cozaar) 25 mg tablet Take by mouth., Historical Med      magnesium glycinate 100 mg magnesium capsule Take by mouth., Historical Med      meloxicam (Mobic) 7.5 mg tablet Take 1 tablet (7.5 mg) by mouth once daily., Starting Wed 11/22/2023, Until Thu 11/21/2024, Normal      metoprolol succinate XL (Toprol-XL) 25 mg 24 hr tablet Take 1 tablet (25 mg) by mouth once daily., Starting Fri 3/1/2024, Historical Med      metoprolol tartrate (Lopressor) 25 mg tablet TAKE 1 TABLET BY MOUTH TWICE A DAY, Normal      nicotine (Nicoderm CQ) 7 mg/24 hr patch PLACE 1 PATCH OVER 24 HOURS ON THE SKIN ONCE EVERY 24 HOURS FOR 28 DAYS., Starting Wed 4/10/2024, Until Wed 5/8/2024, Normal      nitroglycerin (Nitrostat) 0.4 mg SL tablet Place under the tongue., Historical Med      ondansetron  (Zofran) 4 mg tablet Take 1 tablet (4 mg) by mouth every 8 hours if needed for nausea for up to 4 days., Starting Thu 6/27/2024, Until Mon 7/1/2024 at 2359, Normal      ondansetron ODT (Zofran-ODT) 4 mg disintegrating tablet Take 1 tablet (4 mg) by mouth every 6 hours., Starting Wed 7/6/2022, Historical Med      potassium chloride CR 10 mEq ER tablet Take by mouth., Starting Tue 8/6/2019, Historical Med      predniSONE (Deltasone) 50 mg tablet Take 1 tablet (50 mg) by mouth once daily., Starting Fri 9/29/2023, Historical Med             ALLERGIES     Amoxicillin and Pantoprazole    FAMILY HISTORY     No family history on file.       SOCIAL HISTORY       Social History     Socioeconomic History    Marital status: Legally      Spouse name: Not on file    Number of children: Not on file    Years of education: Not on file    Highest education level: Not on file   Occupational History    Not on file   Tobacco Use    Smoking status: Every Day     Current packs/day: 1.00     Types: Cigarettes    Smokeless tobacco: Former   Vaping Use    Vaping status: Never Used   Substance and Sexual Activity    Alcohol use: Not Currently    Drug use: Never    Sexual activity: Not on file   Other Topics Concern    Not on file   Social History Narrative    Not on file     Social Determinants of Health     Financial Resource Strain: Not on file   Food Insecurity: Not on file   Transportation Needs: Not on file   Physical Activity: Not on file   Stress: Not on file   Social Connections: Not on file   Intimate Partner Violence: Not on file   Housing Stability: Not on file       SCREENINGS                             PHYSICAL EXAM    (up to 7 for level 4, 8 or more for level 5)   Physical Exam   ED Triage Vitals   Temp Pulse Resp BP   -- -- -- --      SpO2 Temp src Heart Rate Source Patient Position   -- -- -- --      BP Location FiO2 (%)     -- --       Physical Exam  Vitals and nursing note reviewed.   Constitutional:       General:  She is not in acute distress.     Appearance: She is well-developed.   HENT:      Head: Normocephalic and atraumatic.   Eyes:      Conjunctiva/sclera: Conjunctivae normal.   Cardiovascular:      Rate and Rhythm: Normal rate and regular rhythm.      Heart sounds: No murmur heard.  Pulmonary:      Effort: Pulmonary effort is normal. No respiratory distress.      Breath sounds: Normal breath sounds.   Abdominal:      Palpations: Abdomen is soft.      Tenderness: There is no abdominal tenderness. There is no guarding. Negative signs include Melo's sign.   Musculoskeletal:         General: No swelling.      Cervical back: Neck supple.   Skin:     General: Skin is warm and dry.      Capillary Refill: Capillary refill takes less than 2 seconds.   Neurological:      Mental Status: She is alert.   Psychiatric:         Mood and Affect: Mood normal.          DIAGNOSTIC RESULTS     LABS:  Labs Reviewed   CBC WITH AUTO DIFFERENTIAL - Abnormal       Result Value    WBC 12.5 (*)     nRBC 0.0      RBC 4.96      Hemoglobin 14.7      Hematocrit 44.0      MCV 89      MCH 29.6      MCHC 33.4      RDW 12.8      Platelets 256      Neutrophils % 65.1      Immature Granulocytes %, Automated 0.5      Lymphocytes % 25.6      Monocytes % 7.0      Eosinophils % 1.6      Basophils % 0.2      Neutrophils Absolute 8.16 (*)     Immature Granulocytes Absolute, Automated 0.06      Lymphocytes Absolute 3.21      Monocytes Absolute 0.88      Eosinophils Absolute 0.20      Basophils Absolute 0.03     COMPREHENSIVE METABOLIC PANEL - Abnormal    Glucose 142 (*)     Sodium 140      Potassium 3.8      Chloride 102      Bicarbonate 28      Anion Gap 14      Urea Nitrogen 14      Creatinine 0.77      eGFR 86      Calcium 9.4      Albumin 4.7      Alkaline Phosphatase 61      Total Protein 8.0      AST 12      Bilirubin, Total 0.5      ALT 16     URINALYSIS WITH REFLEX CULTURE AND MICROSCOPIC - Abnormal    Color, Urine Light-Yellow      Appearance, Urine  Clear      Specific Gravity, Urine 1.041 (*)     pH, Urine 6.5      Protein, Urine 10 (TRACE)      Glucose, Urine Normal      Blood, Urine 0.03 (TRACE) (*)     Ketones, Urine NEGATIVE      Bilirubin, Urine NEGATIVE      Urobilinogen, Urine Normal      Nitrite, Urine NEGATIVE      Leukocyte Esterase, Urine 25 Chantel/µL (*)    LIPASE - Normal    Lipase 74      Narrative:     Venipuncture immediately after or during the administration of Metamizole may lead to falsely low results. Testing should be performed immediately prior to Metamizole dosing.   LACTATE - Normal    Lactate 1.2      Narrative:     Venipuncture immediately after or during the administration of Metamizole may lead to falsely low results. Testing should be performed immediately  prior to Metamizole dosing.   SERIAL TROPONIN-INITIAL - Normal    Troponin I, High Sensitivity 4      Narrative:     Less than 99th percentile of normal range cutoff-  Female and children under 18 years old <14 ng/L; Male <21 ng/L: Negative  Repeat testing should be performed if clinically indicated.     Female and children under 18 years old 14-50 ng/L; Male 21-50 ng/L:  Consistent with possible cardiac damage and possible increased clinical   risk. Serial measurements may help to assess extent of myocardial damage.     >50 ng/L: Consistent with cardiac damage, increased clinical risk and  myocardial infarction. Serial measurements may help assess extent of   myocardial damage.      NOTE: Children less than 1 year old may have higher baseline troponin   levels and results should be interpreted in conjunction with the overall   clinical context.     NOTE: Troponin I testing is performed using a different   testing methodology at [facility] than at other   [facility]. Direct result comparisons should only   be made within the same method.   SERIAL TROPONIN, 1 HOUR - Normal    Troponin I, High Sensitivity 4      Narrative:     Less than 99th percentile of normal  range cutoff-  Female and children under 18 years old <14 ng/L; Male <21 ng/L: Negative  Repeat testing should be performed if clinically indicated.     Female and children under 18 years old 14-50 ng/L; Male 21-50 ng/L:  Consistent with possible cardiac damage and possible increased clinical   risk. Serial measurements may help to assess extent of myocardial damage.     >50 ng/L: Consistent with cardiac damage, increased clinical risk and  myocardial infarction. Serial measurements may help assess extent of   myocardial damage.      NOTE: Children less than 1 year old may have higher baseline troponin   levels and results should be interpreted in conjunction with the overall   clinical context.     NOTE: Troponin I testing is performed using a different   testing methodology at [facility] than at other   [facility]. Direct result comparisons should only   be made within the same method.   URINE CULTURE   TROPONIN SERIES- (INITIAL, 1 HR)    Narrative:     The following orders were created for panel order Troponin I Series, High Sensitivity (0, 1 HR).  Procedure                               Abnormality         Status                     ---------                               -----------         ------                     Troponin I, High Sensiti...[[id number]]  Normal              Final result               Troponin, High Sensitivi...[[id number]]  Normal              Final result                 Please view results for these tests on the individual orders.   MICROSCOPIC ONLY, URINE    WBC, Urine 1-5      RBC, Urine 3-5      Squamous Epithelial Cells, Urine 1-9 (SPARSE)      Mucus, Urine FEW     URINALYSIS WITH REFLEX CULTURE AND MICROSCOPIC    Narrative:     The following orders were created for panel order Urinalysis with Reflex Culture and Microscopic.  Procedure                               Abnormality         Status                     ---------                               -----------          ------                     Urinalysis with Reflex C...[[id number]]  Abnormal            Final result               Extra Urine Gray Tube[[id number]]                            In process                   Please view results for these tests on the individual orders.   EXTRA URINE GRAY TUBE       All other labs were within normal range or not returned as of this dictation.    Imaging  CT abdomen pelvis w IV contrast   Final Result   1. Findings compatible with mild acute pancreatitis involving the   uncinate process of pancreas. No pseudocyst or abscess. No underlying   pancreatic mass identified.   2. Mild biliary prominence with the common bile duct measuring up to   1.2 cm without definite distal common duct calculus. Could consider   further evaluation with MRCP.   3. Small hiatal hernia.   4. Mild hepatic steatosis.   5. 1.2 cm left adrenal nodule, statistically likely represent an   adenoma.   Signed by Dae Seaman MD              Procedures  Procedures     EMERGENCY DEPARTMENT COURSE/MDM:   Merced Allan is a 64 y.o. female presenting to the ED for evaluation of had concerns including Abdominal Pain (RUQ pain for about 1 week with nausea and diarrhea.)..   Medical Decision Making  64-year-old female with 3 days of right upper quadrant pain with nausea, no vomiting, pain is not reproducible, Melo sign negative.  Cardiac workup initiated plus a lipase and lactate, and was sent for CT. She has no significant electrolyte derangements, and normal liver function.  Her creatinine is 0.77 yielding a GFR around 86.  She does have a mild leukocytosis but no sign of anemia and no thrombocytopenia.  CT shows inflammation around the uncinate process of pancreas, consistent with mild acute pancreatitis, although patient's lipase is not elevated.  She has been having symptoms for several days.  Discussed these findings with the patient and given her classic symptoms, and her CT evidence, meets criteria for mild acute  pancreatitis, already has Zofran, will send several days of oxycodone as well.  Discharged in stable condition with instruction to follow-up outpatient with her primary care provider, and GI if necessary.        ED Course as of 24 1550      0809 EKG performed at 807 and read by me shows sinus rhythm 66 bpm, with an upright axis, normal CT interval, normal QRS, QTc of 436 with no significant ST segment elevation, depression, or other signs of arrhythmia or ischemia [AS]   0933 LIPASE: 74 [AS]   0933 Bilirubin, Total : 0.5  CT shows clip in GB fossa with CBD dilation, however, similar to prior CT's, no LFT's elevation, no lipase or lactate. [AS]   0937 Initial troponin negative [AS]      ED Course User Index  [AS] Alda Dykes DO         Diagnoses as of 24 1550   Acute pancreatitis without infection or necrosis, unspecified pancreatitis type ([facility])   Common bile duct dilation          External records reviewed: I reviewed external records including outpatient, PCP records, and prior discharge summaries    I have reviewed this case with the ED attending physician, and the attending agrees with the plan. Patient or family was counselled regarding labs, imaging, likely diagnosis, and plan. All questions were answered.     Alda Dykes DO  PGY-4, emergency medicine    The above documentation was completed with the use of speech recognition software. It may contain dictation errors secondary to limitations of the software.      ED Medications administered this visit:    Medications   ondansetron (Zofran) injection 4 mg (4 mg intravenous Given 24 0848)   morphine injection 4 mg (4 mg intravenous Given 24 0848)   iohexol (OMNIPaque) 350 mg iodine/mL solution 75 mL (75 mL intravenous Given 24 0921)   lactated Ringer's bolus 1,000 mL (0 mL intravenous Stopped 24 1054)       New Prescriptions from this visit:    Discharge Medication List as of 2024 10:39 AM         START taking these medications    Details   oxyCODONE (Roxicodone) 5 mg immediate release tablet Take 1 tablet (5 mg) by mouth every 6 hours if needed for severe pain (7 - 10) for up to 3 days., Starting Fri 6/28/2024, Until Mon 7/1/2024 at 2359, Normal             Final Impression:   1. Acute pancreatitis without infection or necrosis, unspecified pancreatitis type ([facility])    2. Common bile duct dilation          (Please note that portions of this note were completed with a voice recognition program.  Efforts were made to edit the dictations but occasionally words are mis-transcribed.)     Alda Dykes, DO  Resident  06/28/24 6225

## 2024-06-29 LAB — BACTERIA UR CULT: NORMAL

## 2024-07-07 LAB
ATRIAL RATE: 66 BPM
P AXIS: 25 DEGREES
P OFFSET: 194 MS
P ONSET: 149 MS
PR INTERVAL: 134 MS
Q ONSET: 216 MS
QRS COUNT: 10 BEATS
QRS DURATION: 82 MS
QT INTERVAL: 416 MS
QTC CALCULATION(BAZETT): 436 MS
QTC FREDERICIA: 429 MS
R AXIS: 6 DEGREES
T AXIS: 57 DEGREES
T OFFSET: 424 MS
VENTRICULAR RATE: 66 BPM

## 2024-07-19 ENCOUNTER — HOSPITAL ENCOUNTER (EMERGENCY)
Facility: HOSPITAL | Age: 64
Discharge: OTHER INSTITUTION | End: 2024-07-19
Payer: COMMERCIAL

## 2024-07-19 PROCEDURE — 4500999001 HC ED NO CHARGE

## 2024-07-29 ENCOUNTER — APPOINTMENT (OUTPATIENT)
Dept: PRIMARY CARE | Facility: CLINIC | Age: 64
End: 2024-07-29
Payer: COMMERCIAL

## 2024-08-05 ENCOUNTER — APPOINTMENT (OUTPATIENT)
Dept: PRIMARY CARE | Facility: CLINIC | Age: 64
End: 2024-08-05
Payer: COMMERCIAL

## 2024-08-05 VITALS
HEART RATE: 73 BPM | TEMPERATURE: 97.9 F | BODY MASS INDEX: 24.63 KG/M2 | DIASTOLIC BLOOD PRESSURE: 60 MMHG | WEIGHT: 139 LBS | HEIGHT: 63 IN | SYSTOLIC BLOOD PRESSURE: 110 MMHG | OXYGEN SATURATION: 96 % | RESPIRATION RATE: 16 BRPM

## 2024-08-05 DIAGNOSIS — Z87.891 HX OF SMOKING: ICD-10-CM

## 2024-08-05 DIAGNOSIS — R32 URINARY INCONTINENCE, UNSPECIFIED TYPE: ICD-10-CM

## 2024-08-05 DIAGNOSIS — R19.7 DIARRHEA, UNSPECIFIED TYPE: ICD-10-CM

## 2024-08-05 DIAGNOSIS — E11.00 TYPE 2 DIABETES MELLITUS WITH HYPEROSMOLARITY WITHOUT COMA, WITHOUT LONG-TERM CURRENT USE OF INSULIN (MULTI): Primary | ICD-10-CM

## 2024-08-05 DIAGNOSIS — R13.10 DYSPHAGIA, UNSPECIFIED TYPE: ICD-10-CM

## 2024-08-05 DIAGNOSIS — K21.9 LARYNGOPHARYNGEAL REFLUX (LPR): ICD-10-CM

## 2024-08-05 DIAGNOSIS — K85.00 IDIOPATHIC ACUTE PANCREATITIS WITHOUT INFECTION OR NECROSIS (HHS-HCC): ICD-10-CM

## 2024-08-05 DIAGNOSIS — Z13.6 SCREENING FOR HEART DISEASE: ICD-10-CM

## 2024-08-05 DIAGNOSIS — F33.3 SEVERE EPISODE OF RECURRENT MAJOR DEPRESSIVE DISORDER, WITH PSYCHOTIC FEATURES (MULTI): ICD-10-CM

## 2024-08-05 PROBLEM — F17.200 NICOTINE DEPENDENCE: Status: RESOLVED | Noted: 2023-03-08 | Resolved: 2024-08-05

## 2024-08-05 PROBLEM — F17.200 SMOKING ADDICTION: Status: RESOLVED | Noted: 2023-03-08 | Resolved: 2024-08-05

## 2024-08-05 LAB — POC HEMOGLOBIN A1C: 6 % (ref 4.2–6.5)

## 2024-08-05 PROCEDURE — 3008F BODY MASS INDEX DOCD: CPT | Performed by: INTERNAL MEDICINE

## 2024-08-05 PROCEDURE — 4010F ACE/ARB THERAPY RXD/TAKEN: CPT | Performed by: INTERNAL MEDICINE

## 2024-08-05 PROCEDURE — 3074F SYST BP LT 130 MM HG: CPT | Performed by: INTERNAL MEDICINE

## 2024-08-05 PROCEDURE — 3060F POS MICROALBUMINURIA REV: CPT | Performed by: INTERNAL MEDICINE

## 2024-08-05 PROCEDURE — 3078F DIAST BP <80 MM HG: CPT | Performed by: INTERNAL MEDICINE

## 2024-08-05 PROCEDURE — 3048F LDL-C <100 MG/DL: CPT | Performed by: INTERNAL MEDICINE

## 2024-08-05 PROCEDURE — 99214 OFFICE O/P EST MOD 30 MIN: CPT | Performed by: INTERNAL MEDICINE

## 2024-08-05 PROCEDURE — 83036 HEMOGLOBIN GLYCOSYLATED A1C: CPT | Performed by: INTERNAL MEDICINE

## 2024-08-05 ASSESSMENT — PATIENT HEALTH QUESTIONNAIRE - PHQ9
SUM OF ALL RESPONSES TO PHQ9 QUESTIONS 1 AND 2: 2
10. IF YOU CHECKED OFF ANY PROBLEMS, HOW DIFFICULT HAVE THESE PROBLEMS MADE IT FOR YOU TO DO YOUR WORK, TAKE CARE OF THINGS AT HOME, OR GET ALONG WITH OTHER PEOPLE: SOMEWHAT DIFFICULT
2. FEELING DOWN, DEPRESSED OR HOPELESS: SEVERAL DAYS
1. LITTLE INTEREST OR PLEASURE IN DOING THINGS: SEVERAL DAYS

## 2024-08-05 ASSESSMENT — ENCOUNTER SYMPTOMS
SORE THROAT: 0
WEAKNESS: 0
FATIGUE: 0
DYSURIA: 0
CONFUSION: 0
COUGH: 0
UNEXPECTED WEIGHT CHANGE: 0
JOINT SWELLING: 0
ABDOMINAL PAIN: 0
DIZZINESS: 0
PALPITATIONS: 0
VOMITING: 0
ARTHRALGIAS: 0
NAUSEA: 0
SLEEP DISTURBANCE: 0
CHILLS: 0
ADENOPATHY: 0
CHEST TIGHTNESS: 0
WHEEZING: 0
CONSTIPATION: 0
DIARRHEA: 0
SHORTNESS OF BREATH: 0

## 2024-08-05 NOTE — PROGRESS NOTES
Subjective   Merced Allan is a 64 y.o. female who presents for Follow-up (Follow up DM and multiple symptoms/per patient statement ).  Follow up DM,last HGBA1C  was  5.8 on  4.3.2024,today is 6.0   Checking BG  at home at home   Yesterday had /96, is very upset has some personal problems , has anxiety too.   had a sharp pain going from front to back area upper chest L  side x 4-5 hours , yesterday , now no pain , had anxiety too, got better with magnesium    Eyes blurry vision x 9-10 months , not seen by ophthalmology.   Urinary frequency  for 6 months now,  peeing little then stops  and needs to push hard to pee to eliminate the left over urine , dificult to urinate and some times has diarrhea and stool incontinence.  Patient has  diarrhea ,BM   after every meal 10-15 min later  and stool incontinence, seen in er several times with anxiety and pancreatitis?   Choking with foods  on/off x 6 months  mostly late evening   Patient went to ER  4.27.24 -at [facility] for chest  pain,  ER visit  6.28.26 - acute pancreatitis  and  Er visit 7.19.24 -patient states she can not remember the  issue, no info  available under the date ER visit in computer   Patient not sure witch metoprolol she is taking  but states is the 25mg  and is 1 tab QD   Quite smoking 2 month ago.    Medical Decision Making ER  64-year-old female with 3 days of right upper quadrant pain with nausea, no vomiting, pain is not reproducible, Melo sign negative.  Cardiac workup initiated plus a lipase and lactate, and was sent for CT. She has no significant electrolyte derangements, and normal liver function.  Her creatinine is 0.77 yielding a GFR around 86.  She does have a mild leukocytosis but no sign of anemia and no thrombocytopenia.  CT shows inflammation around the uncinate process of pancreas, consistent with mild acute pancreatitis, although patient's lipase is not elevated.  She has been having symptoms for several days.  Discussed  "these findings with the patient and given her classic symptoms, and her CT evidence, meets criteria for mild acute pancreatitis, already has Zofran, will send several days of oxycodone as well.  Discharged in stable condition with instruction to follow-up outpatient with her primary care provider, and GI if necessary.     Review of Systems   Constitutional:  Negative for chills, fatigue and unexpected weight change.        Comment   HENT:  Negative for congestion, ear pain and sore throat.    Respiratory:  Negative for cough, chest tightness, shortness of breath and wheezing.    Cardiovascular:  Negative for palpitations and leg swelling.   Gastrointestinal:  Negative for abdominal pain, constipation, diarrhea, nausea and vomiting.   Genitourinary:  Negative for dysuria and urgency.   Musculoskeletal:  Negative for arthralgias and joint swelling.   Skin:  Negative for rash.   Neurological:  Negative for dizziness and weakness.   Hematological:  Negative for adenopathy.   Psychiatric/Behavioral:  Negative for confusion and sleep disturbance.        Objective   Physical Exam  Constitutional:       Appearance: Normal appearance.   HENT:      Head: Normocephalic and atraumatic.   Eyes:      Pupils: Pupils are equal, round, and reactive to light.   Cardiovascular:      Rate and Rhythm: Normal rate and regular rhythm.   Pulmonary:      Effort: Pulmonary effort is normal.      Breath sounds: Normal breath sounds.   Musculoskeletal:         General: Normal range of motion.      Cervical back: Normal range of motion and neck supple.   Skin:     General: Skin is warm.   Neurological:      General: No focal deficit present.      Mental Status: She is alert and oriented to person, place, and time.   Psychiatric:         Mood and Affect: Mood normal.         Behavior: Behavior normal.       /60 (BP Location: Left arm, Patient Position: Sitting)   Pulse 73   Temp 36.6 °C (97.9 °F)   Resp 16   Ht 1.6 m (5' 3\")   Wt 63 " "kg (139 lb)   SpO2 96%   BMI 24.62 kg/m²     === 06/28/24 ===    CT ABDOMEN PELVIS W IV CONTRAST    - Impression -  1. Findings compatible with mild acute pancreatitis involving the  uncinate process of pancreas. No pseudocyst or abscess. No underlying  pancreatic mass identified.  2. Mild biliary prominence with the common bile duct measuring up to  1.2 cm without definite distal common duct calculus. Could consider  further evaluation with MRCP.  3. Small hiatal hernia.  4. Mild hepatic steatosis.  5. 1.2 cm left adrenal nodule, statistically likely represent an  adenoma.  Signed by Dae Seaman MD     Lab Results   Component Value Date    CHOL 145 03/21/2024    CHOL 137 07/06/2022    CHOL 153 05/27/2021     Lab Results   Component Value Date    HDL 28.9 03/21/2024    HDL 27.0 (A) 07/06/2022    HDL 33.0 (A) 05/27/2021     Lab Results   Component Value Date    LDLCALC 63 03/21/2024     Lab Results   Component Value Date    TRIG 265 (H) 03/21/2024    TRIG 167 (H) 07/06/2022    TRIG 145 05/27/2021     No components found for: \"CHOLHDL\"    Assessment/Plan   Problem List Items Addressed This Visit       Dysphagia    Relevant Orders    Referral to Gastroenterology    FL GI esophagram    Diarrhea    Relevant Orders    Referral to Gastroenterology    Diabetes mellitus, type 2 (Multi) - Primary    Relevant Orders    POCT glycosylated hemoglobin (Hb A1C) manually resulted (Completed)    Idiopathic acute pancreatitis without infection or necrosis (HHS-HCC)     Not clear etiology, has gb sx, no stones, not drinking, tr 265, will refer to gi         Relevant Orders    Referral to Gastroenterology    Laryngopharyngeal reflux (LPR)    Urinary incontinence    Relevant Orders    Referral to Urogynecology    Screening for heart disease    Relevant Orders    CT cardiac scoring wo IV contrast       "

## 2024-08-12 ENCOUNTER — APPOINTMENT (OUTPATIENT)
Dept: OBSTETRICS AND GYNECOLOGY | Facility: CLINIC | Age: 64
End: 2024-08-12
Payer: COMMERCIAL

## 2024-08-14 ENCOUNTER — APPOINTMENT (OUTPATIENT)
Dept: RADIOLOGY | Facility: HOSPITAL | Age: 64
End: 2024-08-14
Payer: COMMERCIAL

## 2024-08-14 DIAGNOSIS — I10 ESSENTIAL (PRIMARY) HYPERTENSION: Primary | ICD-10-CM

## 2024-08-15 ENCOUNTER — TELEPHONE (OUTPATIENT)
Dept: PRIMARY CARE | Facility: CLINIC | Age: 64
End: 2024-08-15
Payer: COMMERCIAL

## 2024-08-15 DIAGNOSIS — I10 ESSENTIAL (PRIMARY) HYPERTENSION: Primary | ICD-10-CM

## 2024-08-15 RX ORDER — AMLODIPINE BESYLATE 5 MG/1
TABLET ORAL
Qty: 90 TABLET | Refills: 0 | Status: SHIPPED | OUTPATIENT
Start: 2024-08-15 | End: 2024-08-16 | Stop reason: SDUPTHER

## 2024-08-16 RX ORDER — AMLODIPINE BESYLATE 5 MG/1
5 TABLET ORAL DAILY
Qty: 90 TABLET | Refills: 0 | Status: SHIPPED | OUTPATIENT
Start: 2024-08-16

## 2024-08-23 ENCOUNTER — APPOINTMENT (OUTPATIENT)
Dept: RADIOLOGY | Facility: HOSPITAL | Age: 64
End: 2024-08-23
Payer: COMMERCIAL

## 2024-08-26 ENCOUNTER — APPOINTMENT (OUTPATIENT)
Dept: OBSTETRICS AND GYNECOLOGY | Facility: CLINIC | Age: 64
End: 2024-08-26
Payer: COMMERCIAL

## 2024-08-26 VITALS
BODY MASS INDEX: 24.45 KG/M2 | WEIGHT: 138 LBS | DIASTOLIC BLOOD PRESSURE: 74 MMHG | SYSTOLIC BLOOD PRESSURE: 128 MMHG | HEIGHT: 63 IN

## 2024-08-26 DIAGNOSIS — R15.9 FULL INCONTINENCE OF FECES: ICD-10-CM

## 2024-08-26 DIAGNOSIS — N32.81 OAB (OVERACTIVE BLADDER): Primary | ICD-10-CM

## 2024-08-26 DIAGNOSIS — N39.41 URGE URINARY INCONTINENCE: ICD-10-CM

## 2024-08-26 DIAGNOSIS — R32 URINARY INCONTINENCE, UNSPECIFIED TYPE: ICD-10-CM

## 2024-08-26 LAB
POC APPEARANCE, URINE: CLEAR
POC BILIRUBIN, URINE: NEGATIVE
POC BLOOD, URINE: ABNORMAL
POC COLOR, URINE: YELLOW
POC GLUCOSE, URINE: NEGATIVE MG/DL
POC KETONES, URINE: NEGATIVE MG/DL
POC LEUKOCYTES, URINE: NEGATIVE
POC NITRITE,URINE: NEGATIVE
POC PH, URINE: 6 PH
POC PROTEIN, URINE: NEGATIVE MG/DL
POC SPECIFIC GRAVITY, URINE: >=1.03
POC UROBILINOGEN, URINE: 0.2 EU/DL

## 2024-08-26 PROCEDURE — 3008F BODY MASS INDEX DOCD: CPT | Performed by: STUDENT IN AN ORGANIZED HEALTH CARE EDUCATION/TRAINING PROGRAM

## 2024-08-26 PROCEDURE — 3074F SYST BP LT 130 MM HG: CPT | Performed by: STUDENT IN AN ORGANIZED HEALTH CARE EDUCATION/TRAINING PROGRAM

## 2024-08-26 PROCEDURE — 87086 URINE CULTURE/COLONY COUNT: CPT

## 2024-08-26 PROCEDURE — 3060F POS MICROALBUMINURIA REV: CPT | Performed by: STUDENT IN AN ORGANIZED HEALTH CARE EDUCATION/TRAINING PROGRAM

## 2024-08-26 PROCEDURE — 99244 OFF/OP CNSLTJ NEW/EST MOD 40: CPT | Performed by: STUDENT IN AN ORGANIZED HEALTH CARE EDUCATION/TRAINING PROGRAM

## 2024-08-26 PROCEDURE — 81001 URINALYSIS AUTO W/SCOPE: CPT

## 2024-08-26 PROCEDURE — 81003 URINALYSIS AUTO W/O SCOPE: CPT | Performed by: STUDENT IN AN ORGANIZED HEALTH CARE EDUCATION/TRAINING PROGRAM

## 2024-08-26 PROCEDURE — 4010F ACE/ARB THERAPY RXD/TAKEN: CPT | Performed by: STUDENT IN AN ORGANIZED HEALTH CARE EDUCATION/TRAINING PROGRAM

## 2024-08-26 PROCEDURE — 3048F LDL-C <100 MG/DL: CPT | Performed by: STUDENT IN AN ORGANIZED HEALTH CARE EDUCATION/TRAINING PROGRAM

## 2024-08-26 PROCEDURE — 3078F DIAST BP <80 MM HG: CPT | Performed by: STUDENT IN AN ORGANIZED HEALTH CARE EDUCATION/TRAINING PROGRAM

## 2024-08-26 ASSESSMENT — PAIN SCALES - GENERAL: PAINLEVEL: 0-NO PAIN

## 2024-08-26 NOTE — PROGRESS NOTES
New pt here for urinary incont. Pt states she has to strain to urinate. Pt states she uses the restroom 6-11 times in 1 hour  PVR = 10ml     Chaperone declined: Brooklynn Linares, CM3      Referred by: Dr. Bahena     PCP  Dion Bahena MD         CHIEF COMPLAINT:  urinary incontinence         HISTORY OF PRESENT ILLNESS:  This is a  64 y.o. y.o. female who presents with urinary frequency. Notes relationship with stress. 6 times in a hour. For about the last year.    The following were reviewed to gain additional history:  External notes: Dr. Bahena note: frequency, pushing to urinate, fecal incontinence (referred to GI and here)  Test results:   Cr 0.77 6/28/24, POCT HbA1c 6.0% 8/5/24         Specifically, she describes the following pelvic floor symptoms:          Prolapse: No       - Splinting to urinate: No       - Splinting for bowel movement/stool trapping: No              Incontinence:  Yes              Urge related              Urinary Symptoms:       - Frequency:  Yes             # Voids:         - Nocturia: Yes             # Voids:         - Urgency:  Yes       - Incomplete emptying:  No       - Hesitancy:  No       - Pain with voiding:  No       - Excessive fluid intake: No, just water and very limited (sips here and there)               History:       - Recurrent UTI:  No       - Hematuria:  No       - Stones:  No       - Kidney Disease:  No                  Bowel Symptoms:       - Regular: Yes       - Diarrhea:Yes       - Constipation: No       - Fecal Incontinence:  Yes       - Flatus Incontinence:  No       - Fecal urgency:   Yes    Past medical and surgical hx reviewed - pertinent for   PMH: T2DM, pancreatitis  PSH:  BERNADETTE/BSO, emil, appy  Smoking history: current 1 PPD        Gyn History:  - Menopausal: Yes           Postmenopausal bleeding: No  - HRT: No  - Pap up to date: Yes - s/p hyst  - Sexually active:  No  - Number of prior vaginal deliveries: 2   Number of prior operative deliveries:  0   Prior OASI? 0  - Number of prior c-sections: 0    - Mammogram up to date: Yes  - Colonoscopy up to date: Yes    OB History    No obstetric history on file.               PHYSICAL EXAMINATION:  No LMP recorded. Patient is postmenopausal.  There is no height or weight on file to calculate BMI.  There were no vitals taken for this visit.  General Appearance: well appearing  Neuro: Alert and oriented   HEENT: mucous membranes moist, neck supple  Resp: No respiratory distress, normal work of breathing  MSK: normal range of motion, gait appropriate    Pelvic: Deferred    PVR (by Ultrasound): 10 ml         IMPRESSION AND PLAN:  Merced Allan is a 64 y.o. who presents with OAB, FI.    OAB  - discussed etiology of OAB and potential management options  - reviewed bladder health recommendations (fluid intake, avoiding bladder triggers)  - discussed treatment options: PFPT, medications, PTNS, intradetrusor botox, SNM  - opting for expectant management, declines medication  - reviewed instructions for kegel exercises to do at home, bladder control strategies and increased water intake as she really does not drink very much     Fecal incontinence  - reviewed multifactorial nature of fecal incontinence/accidental bowel leakage including sphincter injury during childbirth, stool consistency, neurologic and functional components  - discussed initial management strategy: fiber supplementation   - has appointment scheduled with GI to discuss chronic diarrhea    All questions and concerns were answered and addressed.  The patient expressed understanding and agrees with the plan.     RTC as needed    8/26/2024

## 2024-08-27 LAB
APPEARANCE UR: CLEAR
BILIRUB UR STRIP.AUTO-MCNC: NEGATIVE MG/DL
CAOX CRY #/AREA UR COMP ASSIST: NORMAL /HPF
COLOR UR: YELLOW
GLUCOSE UR STRIP.AUTO-MCNC: NORMAL MG/DL
KETONES UR STRIP.AUTO-MCNC: NEGATIVE MG/DL
LEUKOCYTE ESTERASE UR QL STRIP.AUTO: NEGATIVE
MUCOUS THREADS #/AREA URNS AUTO: NORMAL /LPF
NITRITE UR QL STRIP.AUTO: NEGATIVE
PH UR STRIP.AUTO: 5.5 [PH]
PROT UR STRIP.AUTO-MCNC: NORMAL MG/DL
RBC # UR STRIP.AUTO: NEGATIVE /UL
RBC #/AREA URNS AUTO: NORMAL /HPF
SP GR UR STRIP.AUTO: 1.02
UROBILINOGEN UR STRIP.AUTO-MCNC: NORMAL MG/DL
WBC #/AREA URNS AUTO: NORMAL /HPF

## 2024-08-28 ENCOUNTER — HOSPITAL ENCOUNTER (EMERGENCY)
Facility: HOSPITAL | Age: 64
Discharge: STILL ADMITTED OR ADMINISTRATIVE | End: 2024-08-28
Payer: COMMERCIAL

## 2024-08-28 LAB — BACTERIA UR CULT: NORMAL

## 2024-08-28 PROCEDURE — 4500999001 HC ED NO CHARGE

## 2024-08-29 ENCOUNTER — APPOINTMENT (OUTPATIENT)
Dept: GASTROENTEROLOGY | Facility: CLINIC | Age: 64
End: 2024-08-29
Payer: COMMERCIAL

## 2024-09-05 ENCOUNTER — TELEPHONE (OUTPATIENT)
Dept: PRIMARY CARE | Facility: CLINIC | Age: 64
End: 2024-09-05
Payer: COMMERCIAL

## 2024-09-05 DIAGNOSIS — F32.0 CURRENT MILD EPISODE OF MAJOR DEPRESSIVE DISORDER WITHOUT PRIOR EPISODE (CMS-HCC): Primary | ICD-10-CM

## 2024-09-05 RX ORDER — ESCITALOPRAM OXALATE 10 MG/1
10 TABLET ORAL DAILY
Qty: 30 TABLET | Refills: 5 | Status: SHIPPED | OUTPATIENT
Start: 2024-09-05 | End: 2025-03-04

## 2024-09-05 NOTE — TELEPHONE ENCOUNTER
Patient was in office on 8/5.  Patient forgot to ask for refill of Lexapro which she states you prescribed her before and then she stopped it for a little while.  Patient was not sure if you could just refill this medication or if she needs to come back in. If she needs to come back in, she is requesting to stop in today because she is in the area.

## 2024-09-11 ENCOUNTER — APPOINTMENT (OUTPATIENT)
Dept: RADIOLOGY | Facility: HOSPITAL | Age: 64
End: 2024-09-11
Payer: COMMERCIAL

## 2024-09-13 ENCOUNTER — HOSPITAL ENCOUNTER (OUTPATIENT)
Dept: RADIOLOGY | Facility: HOSPITAL | Age: 64
Discharge: HOME | End: 2024-09-13
Payer: COMMERCIAL

## 2024-09-13 DIAGNOSIS — Z13.6 SCREENING FOR HEART DISEASE: ICD-10-CM

## 2024-09-13 PROCEDURE — 75571 CT HRT W/O DYE W/CA TEST: CPT

## 2024-09-20 ENCOUNTER — HOSPITAL ENCOUNTER (OUTPATIENT)
Dept: RADIOLOGY | Facility: HOSPITAL | Age: 64
End: 2024-09-20
Payer: COMMERCIAL

## 2024-11-25 ENCOUNTER — APPOINTMENT (OUTPATIENT)
Dept: PRIMARY CARE | Facility: CLINIC | Age: 64
End: 2024-11-25
Payer: COMMERCIAL

## 2024-11-25 VITALS
OXYGEN SATURATION: 96 % | WEIGHT: 143 LBS | RESPIRATION RATE: 16 BRPM | DIASTOLIC BLOOD PRESSURE: 68 MMHG | TEMPERATURE: 98.2 F | SYSTOLIC BLOOD PRESSURE: 112 MMHG | HEIGHT: 63 IN | BODY MASS INDEX: 25.34 KG/M2 | HEART RATE: 77 BPM

## 2024-11-25 DIAGNOSIS — F17.210 CIGARETTE NICOTINE DEPENDENCE WITHOUT COMPLICATION: ICD-10-CM

## 2024-11-25 DIAGNOSIS — E78.5 DYSLIPIDEMIA: ICD-10-CM

## 2024-11-25 DIAGNOSIS — E11.00 TYPE 2 DIABETES MELLITUS WITH HYPEROSMOLARITY WITHOUT COMA, WITHOUT LONG-TERM CURRENT USE OF INSULIN (MULTI): ICD-10-CM

## 2024-11-25 DIAGNOSIS — E55.9 VITAMIN D DEFICIENCY: ICD-10-CM

## 2024-11-25 DIAGNOSIS — I10 ESSENTIAL (PRIMARY) HYPERTENSION: ICD-10-CM

## 2024-11-25 DIAGNOSIS — E53.8 VITAMIN B12 DEFICIENCY: ICD-10-CM

## 2024-11-25 DIAGNOSIS — R13.10 DYSPHAGIA, UNSPECIFIED TYPE: ICD-10-CM

## 2024-11-25 DIAGNOSIS — R43.9 TASTE DISORDER: ICD-10-CM

## 2024-11-25 DIAGNOSIS — K21.00 GASTROESOPHAGEAL REFLUX DISEASE WITH ESOPHAGITIS WITHOUT HEMORRHAGE: ICD-10-CM

## 2024-11-25 DIAGNOSIS — F06.4 ANXIETY DISORDER DUE TO MEDICAL CONDITION: ICD-10-CM

## 2024-11-25 DIAGNOSIS — Z12.31 ENCOUNTER FOR SCREENING MAMMOGRAM FOR MALIGNANT NEOPLASM OF BREAST: ICD-10-CM

## 2024-11-25 DIAGNOSIS — I10 ESSENTIAL HYPERTENSION: ICD-10-CM

## 2024-11-25 DIAGNOSIS — R07.9 CHEST PAIN, UNSPECIFIED TYPE: ICD-10-CM

## 2024-11-25 DIAGNOSIS — Z00.00 ANNUAL PHYSICAL EXAM: Primary | ICD-10-CM

## 2024-11-25 LAB — POC HEMOGLOBIN A1C: 5.8 % (ref 4.2–6.5)

## 2024-11-25 PROCEDURE — 99396 PREV VISIT EST AGE 40-64: CPT | Performed by: INTERNAL MEDICINE

## 2024-11-25 PROCEDURE — 3048F LDL-C <100 MG/DL: CPT | Performed by: INTERNAL MEDICINE

## 2024-11-25 PROCEDURE — 3074F SYST BP LT 130 MM HG: CPT | Performed by: INTERNAL MEDICINE

## 2024-11-25 PROCEDURE — 3060F POS MICROALBUMINURIA REV: CPT | Performed by: INTERNAL MEDICINE

## 2024-11-25 PROCEDURE — 3008F BODY MASS INDEX DOCD: CPT | Performed by: INTERNAL MEDICINE

## 2024-11-25 PROCEDURE — 99214 OFFICE O/P EST MOD 30 MIN: CPT | Performed by: INTERNAL MEDICINE

## 2024-11-25 PROCEDURE — 3078F DIAST BP <80 MM HG: CPT | Performed by: INTERNAL MEDICINE

## 2024-11-25 PROCEDURE — 83036 HEMOGLOBIN GLYCOSYLATED A1C: CPT | Performed by: INTERNAL MEDICINE

## 2024-11-25 RX ORDER — AMLODIPINE BESYLATE 5 MG/1
7.5 TABLET ORAL DAILY
Qty: 135 TABLET | Refills: 1 | Status: SHIPPED | OUTPATIENT
Start: 2024-11-25

## 2024-11-25 RX ORDER — METOPROLOL SUCCINATE 25 MG/1
25 TABLET, EXTENDED RELEASE ORAL DAILY
Qty: 90 TABLET | Refills: 3 | Status: SHIPPED | OUTPATIENT
Start: 2024-11-25

## 2024-11-25 RX ORDER — FAMOTIDINE 20 MG/1
20 TABLET, FILM COATED ORAL 2 TIMES DAILY
COMMUNITY
Start: 2024-11-24

## 2024-11-25 RX ORDER — HYDROXYZINE HYDROCHLORIDE 10 MG/1
10 TABLET, FILM COATED ORAL 3 TIMES DAILY
Qty: 30 TABLET | Refills: 3 | Status: SHIPPED | OUTPATIENT
Start: 2024-11-25 | End: 2025-01-04

## 2024-11-25 NOTE — ASSESSMENT & PLAN NOTE
On amlodipine 5 mg in am , and metoprolol  Check bp in pm and if higher then 160 take 1/2 pill on amlodipine.

## 2024-11-25 NOTE — PROGRESS NOTES
Subjective   Merced Alaln is a 64 y.o. female who presents for Annual Exam, Follow-up, Diabetes, Hospital Follow-up, Hypertension, and Anxiety.  Patient presents for her Yearly Adult Physical, CPE.     Hospital Follow up. Patient was at the ED on 2 different occasion this month for Chest Pain and hbp.  TCM N/A  ED  11/20/24-11/23/2024   [facility]  Chest Pain Started 2 hours PTA. Midsternal non radiating. Denies SOB. Endorses mild nausea and increased urinary frequency.  EKG was perfomed twice was Normal.  Last A1C was 6.0%  Today A1C is 5.8%  Hospital records, labs and test reviewed, discharge instruction and medication discussed with pt.    CT  cardiac scoring - 9.17.24, was 0  Refusing vaccines     No previous MI or CAD. She has had multiple stress tests in the past, most recent Stress NM Exercise 2/2023 was low risk.   PAST CARDIAC HISTORY:  Stress NM exercise 2/28/23  CONCLUSIONS:    1. SPECT Perfusion Study: Normal.    2. There is no scintigraphic evidence for inducible ischemia.    3. No evidence of scarred myocardium.    4. Left ventricle is normal in size. The left ventricle systolic   function is hyperdynamic.    5. This is a low risk scan.    LVEF % 77      Echo 12/2022  - The left ventricle is normal in size. Left ventricular systolic function is   normal. EF = 66 ± 5% (2D biplane) Normal left ventricular diastolic function.   - The right ventricle is normal in size. Right ventricular systolic function is   normal  Wall Motion:   All scored segments are normal.     Stress NM Pharm 1/18/21   1. SPECT Perfusion Study: Normal.    2. There is no scintigraphic evidence for inducible ischemia.    3. No evidence of scarred myocardium.    4. Left ventricle is normal in size. The left ventricle systolic   function is normal.    5. Right ventricle is normal in size.    6. This is a low risk scan.           Gated Stress FBP    LVEF % 73     Needs to be seen by charity,  "didn't have test done yet, will start on ppi  Review of Systems   All other systems reviewed and are negative.      Objective   Physical Exam  /68 (BP Location: Right arm, Patient Position: Sitting, BP Cuff Size: Large adult)   Pulse 77   Temp 36.8 °C (98.2 °F) (Temporal)   Resp 16   Ht 1.6 m (5' 3\")   Wt 64.9 kg (143 lb)   SpO2 96%   BMI 25.33 kg/m²       Assessment/Plan   Problem List Items Addressed This Visit       Dysphagia    Relevant Orders    Referral to Gastroenterology    Dyslipidemia     Cardiac Risk Assessment  More then 15 minutes were spent assessing and discussing cardiovascular risk was and, if needed, lifestyle modifications recommended, including nutritional choices, exercise, and elimination of habits contributing to risk. Aspirin use/disuse was discussed following the guidelines below.     Low dose ASA ( mg) should be considered:  If you have prior Heart Attack/Stroke/Peripheral vascular disease:  Generally recommend daily low dose aspirin unless extremely high bleeding risk (e.g., gastrointestinal).     If you do not have prior Heart Attack/Stroke/Peripheral vascular disease:    Age < 70 and your 10-year cardiovascular disease risk is >20%, use low dose Aspirin   Age >=70: Do not use Aspirin for prevention  I have discussed the cardiovascular risk and behavioral modification, nutrition, exercise and elimination of habits contributing to risk. We agreed to a plan how to reduce the risk.  CV risk estimate calculates is 25.2%  Not taking atorvastatin  Ca score is 0           Relevant Orders    Lipid Panel    Comprehensive Metabolic Panel    Diabetes mellitus, type 2 (Multi) - Primary     Well controlled         Relevant Orders    POCT glycosylated hemoglobin (Hb A1C) manually resulted (Completed)    Albumin-Creatinine Ratio, Urine Random    GERD (gastroesophageal reflux disease)    Relevant Orders    Referral to Gastroenterology    Taste disorder    Relevant Orders    Referral to " ENT    Vitamin D deficiency    Relevant Orders    Vitamin D 25-Hydroxy,Total (for eval of Vitamin D levels)    Vitamin B12 deficiency    Relevant Orders    Vitamin B12    Anxiety disorder due to medical condition     Not taking lexapro.  Will start hydroxyzine prn         Relevant Medications    hydrOXYzine HCL (Atarax) 10 mg tablet    Essential hypertension     On amlodipine 5 mg in am , and metoprolol  Check bp in pm and if higher then 160 take 1/2 pill on amlodipine.         Relevant Medications    metoprolol succinate XL (Toprol-XL) 25 mg 24 hr tablet    Other Relevant Orders    CBC and Auto Differential    TSH with reflex to Free T4 if abnormal    Annual physical exam    Chest pain     Other Visit Diagnoses       Essential (primary) hypertension        Relevant Medications    amLODIPine (Norvasc) 5 mg tablet    Cigarette nicotine dependence without complication        Relevant Orders    CT lung screening low dose    Encounter for screening mammogram for malignant neoplasm of breast        Relevant Orders    BI mammo bilateral screening tomosynthesis

## 2024-11-25 NOTE — ASSESSMENT & PLAN NOTE
Cardiac Risk Assessment  More then 15 minutes were spent assessing and discussing cardiovascular risk was and, if needed, lifestyle modifications recommended, including nutritional choices, exercise, and elimination of habits contributing to risk. Aspirin use/disuse was discussed following the guidelines below.     Low dose ASA ( mg) should be considered:  If you have prior Heart Attack/Stroke/Peripheral vascular disease:  Generally recommend daily low dose aspirin unless extremely high bleeding risk (e.g., gastrointestinal).     If you do not have prior Heart Attack/Stroke/Peripheral vascular disease:    Age < 70 and your 10-year cardiovascular disease risk is >20%, use low dose Aspirin   Age >=70: Do not use Aspirin for prevention  I have discussed the cardiovascular risk and behavioral modification, nutrition, exercise and elimination of habits contributing to risk. We agreed to a plan how to reduce the risk.  CV risk estimate calculates is 25.2%  Not taking atorvastatin  Ca score is 0

## 2024-11-25 NOTE — PATIENT INSTRUCTIONS
Was nice seeing you today.  Continue same medication.  Have lab work done before next appointment if labs were ordered today.  Fu in 3 month.  Call/ contact our office with any concerns.    If you have labs or test done and you can't see the report in your chart or you didn't hear from us in 2 weeks after test/labs done , please, call our office for reports.  Please , do not assume that they were normal.    Any test results  and questions you might have , will be discussed at next visit -- please make sure to make a follow up appt after testing if reports are abnormal or you have questions.  I discussed smoking hx/status that determined  that patient meets criteria for lung cancer screening with low dose CT scan,. Discussion included benefits and harms of screening, fu dx testing, over dx, false positive and radiation exposure.Also importance of adhering to annual lung cancer ct screening. Smoking cessation Intervention provided if pt is still smoking for about 6 to 10 minutes.  ct chest ordered  Low Dose CT Screening   This screening is recommended for patients who:  Are between the age of 50 to 77  Have a 20 pack-year smoking history (20 years of smoking a pack a day)  Are either a current smoker or have quit smoking within the last 15 years  Are in good health - no new cough or unexplained weight loss  Are willing to do the follow-up testing and treatment, if needed  Have not had a chest CT (CAT) scan in the last year

## 2024-12-01 ENCOUNTER — APPOINTMENT (OUTPATIENT)
Dept: RADIOLOGY | Facility: HOSPITAL | Age: 64
End: 2024-12-01
Payer: COMMERCIAL

## 2024-12-01 ENCOUNTER — APPOINTMENT (OUTPATIENT)
Dept: CARDIOLOGY | Facility: HOSPITAL | Age: 64
End: 2024-12-01
Payer: COMMERCIAL

## 2024-12-01 ENCOUNTER — HOSPITAL ENCOUNTER (EMERGENCY)
Facility: HOSPITAL | Age: 64
Discharge: HOME | End: 2024-12-02
Attending: EMERGENCY MEDICINE
Payer: COMMERCIAL

## 2024-12-01 DIAGNOSIS — R07.9 CHEST PAIN, UNSPECIFIED TYPE: Primary | ICD-10-CM

## 2024-12-01 LAB
ALBUMIN SERPL BCP-MCNC: 4.6 G/DL (ref 3.4–5)
ALP SERPL-CCNC: 52 U/L (ref 33–136)
ALT SERPL W P-5'-P-CCNC: 17 U/L (ref 7–45)
ANION GAP SERPL CALC-SCNC: 13 MMOL/L (ref 10–20)
AST SERPL W P-5'-P-CCNC: 16 U/L (ref 9–39)
BASOPHILS # BLD AUTO: 0.03 X10*3/UL (ref 0–0.1)
BASOPHILS NFR BLD AUTO: 0.3 %
BILIRUB SERPL-MCNC: 0.3 MG/DL (ref 0–1.2)
BUN SERPL-MCNC: 22 MG/DL (ref 6–23)
CALCIUM SERPL-MCNC: 9.4 MG/DL (ref 8.6–10.3)
CARDIAC TROPONIN I PNL SERPL HS: 4 NG/L (ref 0–13)
CARDIAC TROPONIN I PNL SERPL HS: 4 NG/L (ref 0–13)
CHLORIDE SERPL-SCNC: 103 MMOL/L (ref 98–107)
CO2 SERPL-SCNC: 26 MMOL/L (ref 21–32)
CREAT SERPL-MCNC: 0.93 MG/DL (ref 0.5–1.05)
D DIMER PPP FEU-MCNC: 271 NG/ML FEU
EGFRCR SERPLBLD CKD-EPI 2021: 69 ML/MIN/1.73M*2
EOSINOPHIL # BLD AUTO: 0.18 X10*3/UL (ref 0–0.7)
EOSINOPHIL NFR BLD AUTO: 1.6 %
ERYTHROCYTE [DISTWIDTH] IN BLOOD BY AUTOMATED COUNT: 12.4 % (ref 11.5–14.5)
GLUCOSE BLD MANUAL STRIP-MCNC: 126 MG/DL (ref 74–99)
GLUCOSE SERPL-MCNC: 134 MG/DL (ref 74–99)
HCT VFR BLD AUTO: 41.6 % (ref 36–46)
HGB BLD-MCNC: 13.6 G/DL (ref 12–16)
IMM GRANULOCYTES # BLD AUTO: 0.05 X10*3/UL (ref 0–0.7)
IMM GRANULOCYTES NFR BLD AUTO: 0.5 % (ref 0–0.9)
INR PPP: 1 (ref 0.9–1.1)
LIPASE SERPL-CCNC: 25 U/L (ref 9–82)
LYMPHOCYTES # BLD AUTO: 4.42 X10*3/UL (ref 1.2–4.8)
LYMPHOCYTES NFR BLD AUTO: 40.5 %
MAGNESIUM SERPL-MCNC: 2.15 MG/DL (ref 1.6–2.4)
MCH RBC QN AUTO: 29.1 PG (ref 26–34)
MCHC RBC AUTO-ENTMCNC: 32.7 G/DL (ref 32–36)
MCV RBC AUTO: 89 FL (ref 80–100)
MONOCYTES # BLD AUTO: 0.75 X10*3/UL (ref 0.1–1)
MONOCYTES NFR BLD AUTO: 6.9 %
NEUTROPHILS # BLD AUTO: 5.48 X10*3/UL (ref 1.2–7.7)
NEUTROPHILS NFR BLD AUTO: 50.2 %
NRBC BLD-RTO: 0 /100 WBCS (ref 0–0)
PLATELET # BLD AUTO: 270 X10*3/UL (ref 150–450)
POTASSIUM SERPL-SCNC: 3.4 MMOL/L (ref 3.5–5.3)
PROT SERPL-MCNC: 7.7 G/DL (ref 6.4–8.2)
PROTHROMBIN TIME: 11.4 SECONDS (ref 9.8–12.8)
RBC # BLD AUTO: 4.67 X10*6/UL (ref 4–5.2)
SODIUM SERPL-SCNC: 139 MMOL/L (ref 136–145)
TSH SERPL-ACNC: 1.45 MIU/L (ref 0.44–3.98)
WBC # BLD AUTO: 10.9 X10*3/UL (ref 4.4–11.3)

## 2024-12-01 PROCEDURE — 84484 ASSAY OF TROPONIN QUANT: CPT | Performed by: EMERGENCY MEDICINE

## 2024-12-01 PROCEDURE — 83690 ASSAY OF LIPASE: CPT | Performed by: EMERGENCY MEDICINE

## 2024-12-01 PROCEDURE — 71045 X-RAY EXAM CHEST 1 VIEW: CPT

## 2024-12-01 PROCEDURE — 99285 EMERGENCY DEPT VISIT HI MDM: CPT | Performed by: EMERGENCY MEDICINE

## 2024-12-01 PROCEDURE — 83735 ASSAY OF MAGNESIUM: CPT | Performed by: EMERGENCY MEDICINE

## 2024-12-01 PROCEDURE — 82947 ASSAY GLUCOSE BLOOD QUANT: CPT

## 2024-12-01 PROCEDURE — 85610 PROTHROMBIN TIME: CPT | Performed by: EMERGENCY MEDICINE

## 2024-12-01 PROCEDURE — 71045 X-RAY EXAM CHEST 1 VIEW: CPT | Performed by: RADIOLOGY

## 2024-12-01 PROCEDURE — 84443 ASSAY THYROID STIM HORMONE: CPT | Performed by: EMERGENCY MEDICINE

## 2024-12-01 PROCEDURE — 85025 COMPLETE CBC W/AUTO DIFF WBC: CPT | Performed by: EMERGENCY MEDICINE

## 2024-12-01 PROCEDURE — 93005 ELECTROCARDIOGRAM TRACING: CPT

## 2024-12-01 PROCEDURE — 80053 COMPREHEN METABOLIC PANEL: CPT | Performed by: EMERGENCY MEDICINE

## 2024-12-01 PROCEDURE — 36415 COLL VENOUS BLD VENIPUNCTURE: CPT | Performed by: EMERGENCY MEDICINE

## 2024-12-01 PROCEDURE — 85379 FIBRIN DEGRADATION QUANT: CPT | Performed by: EMERGENCY MEDICINE

## 2024-12-01 ASSESSMENT — HEART SCORE
ECG: NORMAL
RISK FACTORS: 1-2 RISK FACTORS
HISTORY: SLIGHTLY SUSPICIOUS
AGE: 45-64
HEART SCORE: 2
TROPONIN: LESS THAN OR EQUAL TO NORMAL LIMIT

## 2024-12-01 ASSESSMENT — PAIN SCALES - GENERAL
PAINLEVEL_OUTOF10: 1
PAINLEVEL_OUTOF10: 1
PAINLEVEL_OUTOF10: 2
PAINLEVEL_OUTOF10: 2

## 2024-12-01 ASSESSMENT — PAIN - FUNCTIONAL ASSESSMENT
PAIN_FUNCTIONAL_ASSESSMENT: 0-10
PAIN_FUNCTIONAL_ASSESSMENT: 0-10

## 2024-12-01 ASSESSMENT — PAIN DESCRIPTION - PAIN TYPE: TYPE: ACUTE PAIN

## 2024-12-01 ASSESSMENT — PAIN DESCRIPTION - DESCRIPTORS: DESCRIPTORS: PRESSURE

## 2024-12-01 ASSESSMENT — PAIN DESCRIPTION - LOCATION: LOCATION: CHEST

## 2024-12-02 VITALS
DIASTOLIC BLOOD PRESSURE: 77 MMHG | HEART RATE: 68 BPM | OXYGEN SATURATION: 96 % | SYSTOLIC BLOOD PRESSURE: 160 MMHG | WEIGHT: 142 LBS | HEIGHT: 64 IN | BODY MASS INDEX: 24.24 KG/M2 | RESPIRATION RATE: 20 BRPM | TEMPERATURE: 97.7 F

## 2024-12-02 LAB
ATRIAL RATE: 66 BPM
ATRIAL RATE: 73 BPM
P AXIS: 33 DEGREES
P AXIS: 39 DEGREES
P OFFSET: 191 MS
P OFFSET: 196 MS
P ONSET: 145 MS
P ONSET: 148 MS
PR INTERVAL: 140 MS
PR INTERVAL: 154 MS
Q ONSET: 218 MS
Q ONSET: 222 MS
QRS COUNT: 11 BEATS
QRS COUNT: 12 BEATS
QRS DURATION: 74 MS
QRS DURATION: 82 MS
QT INTERVAL: 396 MS
QT INTERVAL: 410 MS
QTC CALCULATION(BAZETT): 429 MS
QTC CALCULATION(BAZETT): 436 MS
QTC FREDERICIA: 423 MS
QTC FREDERICIA: 423 MS
R AXIS: 23 DEGREES
R AXIS: 37 DEGREES
T AXIS: 61 DEGREES
T AXIS: 64 DEGREES
T OFFSET: 416 MS
T OFFSET: 427 MS
VENTRICULAR RATE: 66 BPM
VENTRICULAR RATE: 73 BPM

## 2024-12-02 PROCEDURE — 96374 THER/PROPH/DIAG INJ IV PUSH: CPT | Mod: 59

## 2024-12-02 PROCEDURE — 2500000004 HC RX 250 GENERAL PHARMACY W/ HCPCS (ALT 636 FOR OP/ED): Performed by: EMERGENCY MEDICINE

## 2024-12-02 RX ORDER — KETOROLAC TROMETHAMINE 15 MG/ML
15 INJECTION, SOLUTION INTRAMUSCULAR; INTRAVENOUS ONCE
Status: COMPLETED | OUTPATIENT
Start: 2024-12-02 | End: 2024-12-02

## 2024-12-02 ASSESSMENT — PAIN SCALES - GENERAL: PAINLEVEL_OUTOF10: 1

## 2024-12-02 NOTE — ED PROVIDER NOTES
HPI   Chief Complaint   Patient presents with   • Chest Pain     Squad gave full ASA, 2 sl NTG and zofran; patient has no cardiac history but does have HTN and DM; patient had midsternal nonradiating CP x 1 hr; NTG did not help       This is a 64 years old female patient presented to the emergency department with a chief complaint of midsternal chest pain that started an hour prior to arrival to the emergency department.  She came by squad, was given aspirin and nitroglycerin as well as Zofran and that helped with her pain.  Stated that the pain last for 1 to 2 seconds with no radiation.  Denies shortness of breath, fever, cough, headache, lightheadedness, dizziness, abdominal pain, diarrhea, constipation, weakness, numbness, or urinary symptoms.    Review of system: As stated above in the HPI section.              Patient History   No past medical history on file.  Past Surgical History:   Procedure Laterality Date   • OTHER SURGICAL HISTORY  08/12/2019    Surgery   • OTHER SURGICAL HISTORY  08/12/2019    Shoulder surgery   • OTHER SURGICAL HISTORY  08/12/2019    Appendectomy   • OTHER SURGICAL HISTORY  08/12/2019    Colposcopy   • OTHER SURGICAL HISTORY  08/12/2019    Cholecystectomy   • OTHER SURGICAL HISTORY  08/12/2019    Hysterectomy     No family history on file.  Social History     Tobacco Use   • Smoking status: Every Day     Current packs/day: 1.00     Types: Cigarettes   • Smokeless tobacco: Former   Vaping Use   • Vaping status: Never Used   Substance Use Topics   • Alcohol use: Not Currently   • Drug use: Never       Physical Exam   ED Triage Vitals [12/01/24 2139]   Temperature Heart Rate Respirations BP   36.5 °C (97.7 °F) 80 18 147/75      Pulse Ox Temp src Heart Rate Source Patient Position   (!) 93 % -- -- --      BP Location FiO2 (%)     -- --       Physical Exam  Vitals and nursing note reviewed.   Constitutional:       General: She is not in acute distress.     Appearance: She is well-developed.    HENT:      Head: Normocephalic and atraumatic.   Eyes:      Conjunctiva/sclera: Conjunctivae normal.   Cardiovascular:      Rate and Rhythm: Normal rate and regular rhythm.      Heart sounds: No murmur heard.  Pulmonary:      Effort: Pulmonary effort is normal. No respiratory distress.      Breath sounds: Normal breath sounds.   Abdominal:      Palpations: Abdomen is soft.      Tenderness: There is no abdominal tenderness.   Musculoskeletal:         General: No swelling.      Cervical back: Neck supple.   Skin:     General: Skin is warm and dry.      Capillary Refill: Capillary refill takes less than 2 seconds.   Neurological:      Mental Status: She is alert.   Psychiatric:         Mood and Affect: Mood normal.           ED Course & MDM   Diagnoses as of 12/02/24 0058   Chest pain, unspecified type                 No data recorded     [place] Coma Scale Score: 15 (12/01/24 2219 : Yanci Romero RN)                           Medical Decision Making  Patient seen and examined, EKG is unremarkable for acute changes/ischemic changes.  Vital signs reviewed and are unremarkable.  Given the pleuritic intermittent chest pain as well as the patient's age and risk factors we will obtain cardiac workup as well as D-dimer.  Will obtain TSH.    Chest x-ray is unremarkable for acute changes.    Workup is unremarkable.  Patient is chest pain-free.  Patient is discharged home to follow-up with a primary care provider as well as cardiology service and to return to the emergency department if alarming symptoms arise as per discharge instruction.        Procedure  Procedures     Darrion Ríos DO  12/02/24 0059

## 2024-12-02 NOTE — DISCHARGE INSTRUCTIONS
Please follow-up with the primary care provider in 2 to 3 days.  Return to the emergency department if you develop recurrent chest pain, shortness of breath, nausea, vomiting lightheadedness, dizziness, or if concerns arise.  Follow-up with cardiology service as well.

## 2024-12-05 ENCOUNTER — TELEPHONE (OUTPATIENT)
Dept: PRIMARY CARE | Facility: CLINIC | Age: 64
End: 2024-12-05

## 2024-12-05 ENCOUNTER — OFFICE VISIT (OUTPATIENT)
Dept: PRIMARY CARE | Facility: CLINIC | Age: 64
End: 2024-12-05
Payer: COMMERCIAL

## 2024-12-05 VITALS
HEIGHT: 64 IN | OXYGEN SATURATION: 96 % | DIASTOLIC BLOOD PRESSURE: 60 MMHG | TEMPERATURE: 97.2 F | SYSTOLIC BLOOD PRESSURE: 128 MMHG | HEART RATE: 72 BPM | BODY MASS INDEX: 24.34 KG/M2 | WEIGHT: 142.6 LBS | RESPIRATION RATE: 16 BRPM

## 2024-12-05 DIAGNOSIS — I10 ESSENTIAL HYPERTENSION: ICD-10-CM

## 2024-12-05 DIAGNOSIS — F33.3 SEVERE EPISODE OF RECURRENT MAJOR DEPRESSIVE DISORDER, WITH PSYCHOTIC FEATURES (MULTI): ICD-10-CM

## 2024-12-05 DIAGNOSIS — F06.4 ANXIETY DISORDER DUE TO MEDICAL CONDITION: ICD-10-CM

## 2024-12-05 DIAGNOSIS — K21.9 GASTROESOPHAGEAL REFLUX DISEASE WITHOUT ESOPHAGITIS: ICD-10-CM

## 2024-12-05 DIAGNOSIS — F32.0 CURRENT MILD EPISODE OF MAJOR DEPRESSIVE DISORDER WITHOUT PRIOR EPISODE (CMS-HCC): Primary | ICD-10-CM

## 2024-12-05 DIAGNOSIS — Z09 HOSPITAL DISCHARGE FOLLOW-UP: ICD-10-CM

## 2024-12-05 DIAGNOSIS — R07.89 OTHER CHEST PAIN: Primary | ICD-10-CM

## 2024-12-05 DIAGNOSIS — F32.0 CURRENT MILD EPISODE OF MAJOR DEPRESSIVE DISORDER WITHOUT PRIOR EPISODE (CMS-HCC): ICD-10-CM

## 2024-12-05 PROCEDURE — 3074F SYST BP LT 130 MM HG: CPT | Performed by: INTERNAL MEDICINE

## 2024-12-05 PROCEDURE — 3008F BODY MASS INDEX DOCD: CPT | Performed by: INTERNAL MEDICINE

## 2024-12-05 PROCEDURE — 3048F LDL-C <100 MG/DL: CPT | Performed by: INTERNAL MEDICINE

## 2024-12-05 PROCEDURE — 3060F POS MICROALBUMINURIA REV: CPT | Performed by: INTERNAL MEDICINE

## 2024-12-05 PROCEDURE — 3078F DIAST BP <80 MM HG: CPT | Performed by: INTERNAL MEDICINE

## 2024-12-05 PROCEDURE — 99215 OFFICE O/P EST HI 40 MIN: CPT | Performed by: INTERNAL MEDICINE

## 2024-12-05 RX ORDER — ESCITALOPRAM OXALATE 5 MG/1
5 TABLET ORAL DAILY
Qty: 30 TABLET | Refills: 1 | Status: SHIPPED | OUTPATIENT
Start: 2024-12-05 | End: 2024-12-05 | Stop reason: SDUPTHER

## 2024-12-05 RX ORDER — ESCITALOPRAM OXALATE 5 MG/1
5 TABLET ORAL DAILY
Qty: 30 TABLET | Refills: 1 | Status: SHIPPED | OUTPATIENT
Start: 2024-12-05 | End: 2025-06-03

## 2024-12-05 RX ORDER — NITROGLYCERIN 0.4 MG/1
0.4 TABLET SUBLINGUAL EVERY 5 MIN PRN
Qty: 30 TABLET | Refills: 0 | Status: SHIPPED | OUTPATIENT
Start: 2024-12-05

## 2024-12-05 RX ORDER — LANSOPRAZOLE 30 MG/1
30 CAPSULE, DELAYED RELEASE ORAL
Qty: 30 CAPSULE | Refills: 3 | Status: SHIPPED | OUTPATIENT
Start: 2024-12-05

## 2024-12-05 ASSESSMENT — PATIENT HEALTH QUESTIONNAIRE - PHQ9
7. TROUBLE CONCENTRATING ON THINGS, SUCH AS READING THE NEWSPAPER OR WATCHING TELEVISION: NOT AT ALL
1. LITTLE INTEREST OR PLEASURE IN DOING THINGS: NEARLY EVERY DAY
9. THOUGHTS THAT YOU WOULD BE BETTER OFF DEAD, OR OF HURTING YOURSELF: NOT AT ALL
4. FEELING TIRED OR HAVING LITTLE ENERGY: NEARLY EVERY DAY
SUM OF ALL RESPONSES TO PHQ QUESTIONS 1-9: 15
3. TROUBLE FALLING OR STAYING ASLEEP OR SLEEPING TOO MUCH: NEARLY EVERY DAY
5. POOR APPETITE OR OVEREATING: NEARLY EVERY DAY
8. MOVING OR SPEAKING SO SLOWLY THAT OTHER PEOPLE COULD HAVE NOTICED. OR THE OPPOSITE, BEING SO FIGETY OR RESTLESS THAT YOU HAVE BEEN MOVING AROUND A LOT MORE THAN USUAL: NOT AT ALL
2. FEELING DOWN, DEPRESSED OR HOPELESS: NEARLY EVERY DAY
SUM OF ALL RESPONSES TO PHQ9 QUESTIONS 1 AND 2: 6
6. FEELING BAD ABOUT YOURSELF - OR THAT YOU ARE A FAILURE OR HAVE LET YOURSELF OR YOUR FAMILY DOWN: NOT AT ALL
10. IF YOU CHECKED OFF ANY PROBLEMS, HOW DIFFICULT HAVE THESE PROBLEMS MADE IT FOR YOU TO DO YOUR WORK, TAKE CARE OF THINGS AT HOME, OR GET ALONG WITH OTHER PEOPLE: NOT DIFFICULT AT ALL

## 2024-12-05 NOTE — ASSESSMENT & PLAN NOTE
Seen ib wer, cardiac work up was neg  Had recent ct cardiac angio , neg for cad, to fu with cardiology  Sec to anxiety,

## 2024-12-05 NOTE — PATIENT INSTRUCTIONS
Please, start treatment discussed , start prevacid and Lexapro, can take extra amlodipine or metoprolol if bp nis high  Take hydroxyzine as needed for anxiety.  See cardiology  Fu in 1 month

## 2024-12-05 NOTE — PROGRESS NOTES
Subjective   Merced Allan is a 64 y.o. female who presents for Hospital Follow-up (ER  visit follow up-12.1.2024  for  chest pain ).  ER follow up  visit -12.1.2024  Patient states he went to ER x 3  in the last  2 weeks  for  chest pain  and HTN- 180s/104s  Chief Complaint  Patient presents with  · Chest Pain      Squad gave full ASA, 2 sl NTG and zofran; patient has no cardiac history but does have HTN and DM; patient had midsternal nonradiating CP x 1 hr; NTG did not help      ER report  This is a 64 years old female patient presented to the emergency department with a chief complaint of midsternal chest pain that started an hour prior to arrival to the emergency department.  She came by squad, was given aspirin and nitroglycerin as well as Zofran and that helped with her pain.  Stated that the pain last for 1 to 2 seconds with no radiation.  Denies shortness of breath, fever, cough, headache, lightheadedness, dizziness, abdominal pain, diarrhea, constipation, weakness, numbness, or urinary symptoms.     Medical Decision Making  Patient seen and examined, EKG is unremarkable for acute changes/ischemic changes.  Vital signs reviewed and are unremarkable.  Given the pleuritic intermittent chest pain as well as the patient's age and risk factors we will obtain cardiac workup as well as D-dimer.  Will obtain TSH.     Chest x-ray is unremarkable for acute changes.     Workup is unremarkable.  Patient is chest pain-free.  Patient is discharged home to follow-up with a primary care provider as well as cardiology service and to return to the emergency department if alarming symptoms arise as per discharge instruction.  Patient states  her BP last night was 180/104, she took the extra 1/2 pil BP med  and BP went down to 147/88  and 1 hour later to 127/76  Patent states she still gets the chest pain on/off ,  hot flashes  bilateral arms  and in her upper back area  and states  when she feels this  way the BP  goes up     "Patient was advised to see cardiologist , did not schedule it yet   PHQ2/9  = 15           Review of Systems    Objective   Physical Exam  /60 (BP Location: Left arm, Patient Position: Sitting)   Pulse 72   Temp 36.2 °C (97.2 °F)   Resp 16   Ht 1.626 m (5' 4\")   Wt 64.7 kg (142 lb 9.6 oz)   SpO2 96%   BMI 24.48 kg/m²   FVC   0470  -  CTA CORONARY W IVCON  / ACCESSION #  [id number]     PROCEDURE REASON: Encounter for screening for cardiovascular disorders          * * * * Physician Interpretation * * * *      CTA CORONARY ARTERIES   Direct Image Comparison: none recent     HISTORY: 64 years old Female patient with hypertension, type 2 diabetes,   hypertriglyceridemia, GERD, tobacco use who has intermittent palpitations   and chest pain for evaluation of coronary artery anatomy.     TECHNIQUE: SCANNER: Siemens Definition Flash Dual source 2x128-slice   scanner   PROTOCOL: Spiral imaging of the heart with retrospective gating and   submillimeter slice reconstruction in diastolic phase following   administration of contrast material.   Scan Range: josefina to the base of the heart   CT Dose-Length Product (DLP): 352 mGy*cm   CT Dose Reduction Employed: Automated exposure control (AEC)   CONTRAST: IV administration of 90 ml Omnipaque 350   Premedication with 0.3 mg sublingual nitroglycerin   Scan acquisition: uncomplicated   Macro Version: [id number]   For optimization of anatomic evaluation, advanced 3-D off-line   postprocessing was performed on a dedicated workstation by the   interpreting physician.   STUDY LIMITATIONS: None.     RESULT:   LINES, TUBES and DEVICES: None   limited CHEST: visualized CHEST: Chest wall anatomy: right breast   calcification   visualized LUNGS: unremarkable.   visualized MEDIASTINUM: unremarkable.   PERICARDIUM: unremarkable   CENTRAL PULMONARY ARTERY: normal dimensions. Assessment is limited due to   limited contrast enhancement.     CARDIAC CHAMBERS:   LEFT VENTRICLE: normal " size with normal systolic function; LV EF =  74 %;   LV EDV  101 ml.   RIGHT VENTRICLE: normal size and normal function on qualitative   assessment.   LV/RV reference values: [normal values LV EDV  ml; LVmass 118-238 g   normal value RV EDV  ml]   Left atrium: normal size.   TIERRA: normal.   Right atrium: normal size   CENTRAL VENOUS and PULMONARY VENOUS RETURN: normal.   Coronary Sinus: normal size     MITRAL VALVE: assessment is limited in the current study   Mild thickening at the leaflet tips.   TRICUSPID and PULMONIC VALVE: appear unremarkable.     AORTIC VALVE: trileaflet. No leaflet calcification.     visualized AORTA:   Pathology: No aortic pathology in limited visualized segments of the   aorta,   Intervention: None   Complications: n/a   Aortic Size: Normal size visualized thoracic aorta.   STJ: maintained   Wall Changes: Small focal calcification at the aortic root     AORTIC DIMENSIONS:   AORTIC ROOT: 3.5 cm measured sinus-to-sinus   mid ASCENDING THORACIC AORTA: 3.1 cm   mid DESCENDING THORACIC AORTA: 2.3 cm     CORONARY ANATOMY: normal origin of the coronary arteries.     LEFT MAIN: Coronary Artery Normal sized vessel, which bifurcates into LAD   and LCX.   LM Stenosis and Plaque: No evidence of plaque. No plaque or luminal   stenosis.     LAD: (Left Anterior Descending Coronary Artery) Normal size vessel, which   wraps around the apex.   Gives rise to 3 diagonal branches and small septal branches.   LAD Stenosis and Plaque: No evidence of plaque. No plaque or luminal   stenosis.     LCX: (Left Circumflex Coronary Artery) Normal size vessel, which is   dominant.   Gives rise to a high-lateral branch, 2  lateral branch, a posterolateral   branch, and the PDA.   LCX Stenosis and Plaque: No evidence of plaque. No plaque or luminal   stenosis.     RCA: (Right Coronary Artery) Small size vessel, which is non-dominant..   Gives rise to a conus branch, SA diamond branch, 1 acute marginal branch.   RCA  Stenosis and Plaque: No evidence of plaque. No plaque or luminal   stenosis.     Chemistry    Lab Results   Component Value Date/Time     12/01/2024 2150    K 3.4 (L) 12/01/2024 2150     12/01/2024 2150    CO2 26 12/01/2024 2150    BUN 22 12/01/2024 2150    CREATININE 0.93 12/01/2024 2150    Lab Results   Component Value Date/Time    CALCIUM 9.4 12/01/2024 2150    ALKPHOS 52 12/01/2024 2150    AST 16 12/01/2024 2150    ALT 17 12/01/2024 2150    BILITOT 0.3 12/01/2024 2150        Lab Results   Component Value Date    WBC 10.9 12/01/2024    HGB 13.6 12/01/2024    HCT 41.6 12/01/2024    MCV 89 12/01/2024     12/01/2024         Assessment/Plan   Problem List Items Addressed This Visit       Major depression    Relevant Medications    escitalopram (Lexapro) 5 mg tablet    GERD (gastroesophageal reflux disease)     To see gi,  [facility] only in 3 month, on ppi           Anxiety disorder due to medical condition    Essential hypertension    Severe episode of recurrent major depressive disorder, with psychotic features (Multi)     Not taking lexapro, worried about side effects , pt education.         Chest pain - Primary     Seen ib wer, cardiac work up was neg  Had recent ct cardiac angio , neg for cad, to fu with cardiology  Sec to anxiety,

## 2024-12-17 ENCOUNTER — APPOINTMENT (OUTPATIENT)
Dept: OTOLARYNGOLOGY | Facility: CLINIC | Age: 64
End: 2024-12-17
Payer: COMMERCIAL

## 2024-12-19 ENCOUNTER — APPOINTMENT (OUTPATIENT)
Dept: RADIOLOGY | Facility: HOSPITAL | Age: 64
End: 2024-12-19
Payer: COMMERCIAL

## 2024-12-19 ENCOUNTER — HOSPITAL ENCOUNTER (OUTPATIENT)
Dept: CARDIOLOGY | Facility: HOSPITAL | Age: 64
Discharge: HOME | End: 2024-12-19
Payer: COMMERCIAL

## 2024-12-19 ENCOUNTER — HOSPITAL ENCOUNTER (EMERGENCY)
Facility: HOSPITAL | Age: 64
Discharge: HOME | End: 2024-12-20
Attending: STUDENT IN AN ORGANIZED HEALTH CARE EDUCATION/TRAINING PROGRAM
Payer: COMMERCIAL

## 2024-12-19 DIAGNOSIS — R07.9 CHEST PAIN, UNSPECIFIED TYPE: ICD-10-CM

## 2024-12-19 DIAGNOSIS — R12 HEARTBURN: Primary | ICD-10-CM

## 2024-12-19 LAB
ALBUMIN SERPL BCP-MCNC: 4.3 G/DL (ref 3.4–5)
ALP SERPL-CCNC: 48 U/L (ref 33–136)
ALT SERPL W P-5'-P-CCNC: 12 U/L (ref 7–45)
ANION GAP SERPL CALC-SCNC: 14 MMOL/L (ref 10–20)
AST SERPL W P-5'-P-CCNC: 12 U/L (ref 9–39)
BASOPHILS # BLD AUTO: 0.03 X10*3/UL (ref 0–0.1)
BASOPHILS NFR BLD AUTO: 0.3 %
BILIRUB SERPL-MCNC: 0.3 MG/DL (ref 0–1.2)
BUN SERPL-MCNC: 23 MG/DL (ref 6–23)
CALCIUM SERPL-MCNC: 9.1 MG/DL (ref 8.6–10.3)
CARDIAC TROPONIN I PNL SERPL HS: 4 NG/L (ref 0–13)
CHLORIDE SERPL-SCNC: 103 MMOL/L (ref 98–107)
CO2 SERPL-SCNC: 24 MMOL/L (ref 21–32)
CREAT SERPL-MCNC: 0.81 MG/DL (ref 0.5–1.05)
EGFRCR SERPLBLD CKD-EPI 2021: 81 ML/MIN/1.73M*2
EOSINOPHIL # BLD AUTO: 0.22 X10*3/UL (ref 0–0.7)
EOSINOPHIL NFR BLD AUTO: 2.3 %
ERYTHROCYTE [DISTWIDTH] IN BLOOD BY AUTOMATED COUNT: 12.5 % (ref 11.5–14.5)
GLUCOSE SERPL-MCNC: 142 MG/DL (ref 74–99)
HCT VFR BLD AUTO: 38.7 % (ref 36–46)
HGB BLD-MCNC: 12.9 G/DL (ref 12–16)
IMM GRANULOCYTES # BLD AUTO: 0.02 X10*3/UL (ref 0–0.7)
IMM GRANULOCYTES NFR BLD AUTO: 0.2 % (ref 0–0.9)
LYMPHOCYTES # BLD AUTO: 4.25 X10*3/UL (ref 1.2–4.8)
LYMPHOCYTES NFR BLD AUTO: 44.5 %
MCH RBC QN AUTO: 29.5 PG (ref 26–34)
MCHC RBC AUTO-ENTMCNC: 33.3 G/DL (ref 32–36)
MCV RBC AUTO: 88 FL (ref 80–100)
MONOCYTES # BLD AUTO: 0.61 X10*3/UL (ref 0.1–1)
MONOCYTES NFR BLD AUTO: 6.4 %
NEUTROPHILS # BLD AUTO: 4.43 X10*3/UL (ref 1.2–7.7)
NEUTROPHILS NFR BLD AUTO: 46.3 %
NRBC BLD-RTO: 0 /100 WBCS (ref 0–0)
PLATELET # BLD AUTO: 248 X10*3/UL (ref 150–450)
POTASSIUM SERPL-SCNC: 3.4 MMOL/L (ref 3.5–5.3)
PROT SERPL-MCNC: 6.8 G/DL (ref 6.4–8.2)
RBC # BLD AUTO: 4.38 X10*6/UL (ref 4–5.2)
SODIUM SERPL-SCNC: 138 MMOL/L (ref 136–145)
WBC # BLD AUTO: 9.6 X10*3/UL (ref 4.4–11.3)

## 2024-12-19 PROCEDURE — 99285 EMERGENCY DEPT VISIT HI MDM: CPT | Performed by: STUDENT IN AN ORGANIZED HEALTH CARE EDUCATION/TRAINING PROGRAM

## 2024-12-19 PROCEDURE — 85025 COMPLETE CBC W/AUTO DIFF WBC: CPT | Performed by: STUDENT IN AN ORGANIZED HEALTH CARE EDUCATION/TRAINING PROGRAM

## 2024-12-19 PROCEDURE — 84484 ASSAY OF TROPONIN QUANT: CPT | Performed by: STUDENT IN AN ORGANIZED HEALTH CARE EDUCATION/TRAINING PROGRAM

## 2024-12-19 PROCEDURE — 80053 COMPREHEN METABOLIC PANEL: CPT | Performed by: STUDENT IN AN ORGANIZED HEALTH CARE EDUCATION/TRAINING PROGRAM

## 2024-12-19 PROCEDURE — 93005 ELECTROCARDIOGRAM TRACING: CPT

## 2024-12-19 PROCEDURE — 36415 COLL VENOUS BLD VENIPUNCTURE: CPT | Performed by: STUDENT IN AN ORGANIZED HEALTH CARE EDUCATION/TRAINING PROGRAM

## 2024-12-19 PROCEDURE — 71045 X-RAY EXAM CHEST 1 VIEW: CPT | Performed by: RADIOLOGY

## 2024-12-19 PROCEDURE — 71045 X-RAY EXAM CHEST 1 VIEW: CPT

## 2024-12-19 RX ORDER — ALUMINUM HYDROXIDE, MAGNESIUM HYDROXIDE, AND SIMETHICONE 1200; 120; 1200 MG/30ML; MG/30ML; MG/30ML
30 SUSPENSION ORAL ONCE
Status: DISCONTINUED | OUTPATIENT
Start: 2024-12-19 | End: 2024-12-20 | Stop reason: HOSPADM

## 2024-12-19 RX ORDER — LIDOCAINE HYDROCHLORIDE 20 MG/ML
15 SOLUTION OROPHARYNGEAL ONCE
Status: DISCONTINUED | OUTPATIENT
Start: 2024-12-19 | End: 2024-12-20 | Stop reason: HOSPADM

## 2024-12-19 RX ORDER — ONDANSETRON HYDROCHLORIDE 2 MG/ML
4 INJECTION, SOLUTION INTRAVENOUS ONCE
Status: COMPLETED | OUTPATIENT
Start: 2024-12-19 | End: 2024-12-20

## 2024-12-19 RX ORDER — FAMOTIDINE 20 MG/1
20 TABLET, FILM COATED ORAL ONCE
Status: COMPLETED | OUTPATIENT
Start: 2024-12-19 | End: 2024-12-20

## 2024-12-19 RX ORDER — PANTOPRAZOLE SODIUM 40 MG/1
40 TABLET, DELAYED RELEASE ORAL ONCE
Status: COMPLETED | OUTPATIENT
Start: 2024-12-19 | End: 2024-12-20

## 2024-12-19 ASSESSMENT — PAIN DESCRIPTION - ORIENTATION: ORIENTATION: MID

## 2024-12-19 ASSESSMENT — COLUMBIA-SUICIDE SEVERITY RATING SCALE - C-SSRS
1. IN THE PAST MONTH, HAVE YOU WISHED YOU WERE DEAD OR WISHED YOU COULD GO TO SLEEP AND NOT WAKE UP?: NO
2. HAVE YOU ACTUALLY HAD ANY THOUGHTS OF KILLING YOURSELF?: NO
6. HAVE YOU EVER DONE ANYTHING, STARTED TO DO ANYTHING, OR PREPARED TO DO ANYTHING TO END YOUR LIFE?: NO

## 2024-12-19 ASSESSMENT — PAIN DESCRIPTION - LOCATION: LOCATION: CHEST

## 2024-12-19 ASSESSMENT — PAIN - FUNCTIONAL ASSESSMENT
PAIN_FUNCTIONAL_ASSESSMENT: 0-10
PAIN_FUNCTIONAL_ASSESSMENT: 0-10

## 2024-12-19 ASSESSMENT — PAIN DESCRIPTION - PROGRESSION
CLINICAL_PROGRESSION: NOT CHANGED
CLINICAL_PROGRESSION: GRADUALLY WORSENING

## 2024-12-19 ASSESSMENT — PAIN DESCRIPTION - PAIN TYPE: TYPE: ACUTE PAIN

## 2024-12-19 ASSESSMENT — PAIN DESCRIPTION - DESCRIPTORS
DESCRIPTORS: BURNING;PRESSURE
DESCRIPTORS: BURNING;PRESSURE

## 2024-12-19 ASSESSMENT — PAIN SCALES - GENERAL
PAINLEVEL_OUTOF10: 10 - WORST POSSIBLE PAIN
PAINLEVEL_OUTOF10: 10 - WORST POSSIBLE PAIN

## 2024-12-19 ASSESSMENT — PAIN DESCRIPTION - FREQUENCY: FREQUENCY: INTERMITTENT

## 2024-12-19 ASSESSMENT — PAIN DESCRIPTION - ONSET: ONSET: GRADUAL

## 2024-12-20 ENCOUNTER — APPOINTMENT (OUTPATIENT)
Dept: CARDIOLOGY | Facility: HOSPITAL | Age: 64
End: 2024-12-20
Payer: COMMERCIAL

## 2024-12-20 VITALS
HEART RATE: 62 BPM | SYSTOLIC BLOOD PRESSURE: 162 MMHG | WEIGHT: 145 LBS | RESPIRATION RATE: 17 BRPM | HEIGHT: 64 IN | BODY MASS INDEX: 24.75 KG/M2 | DIASTOLIC BLOOD PRESSURE: 74 MMHG | TEMPERATURE: 97.6 F | OXYGEN SATURATION: 96 %

## 2024-12-20 LAB
ATRIAL RATE: 71 BPM
CARDIAC TROPONIN I PNL SERPL HS: 4 NG/L (ref 0–13)
P AXIS: 30 DEGREES
P OFFSET: 192 MS
P ONSET: 148 MS
PR INTERVAL: 142 MS
Q ONSET: 219 MS
QRS COUNT: 12 BEATS
QRS DURATION: 84 MS
QT INTERVAL: 402 MS
QTC CALCULATION(BAZETT): 436 MS
QTC FREDERICIA: 425 MS
R AXIS: 22 DEGREES
T AXIS: 48 DEGREES
T OFFSET: 420 MS
VENTRICULAR RATE: 71 BPM

## 2024-12-20 PROCEDURE — 2500000001 HC RX 250 WO HCPCS SELF ADMINISTERED DRUGS (ALT 637 FOR MEDICARE OP): Performed by: STUDENT IN AN ORGANIZED HEALTH CARE EDUCATION/TRAINING PROGRAM

## 2024-12-20 PROCEDURE — 96374 THER/PROPH/DIAG INJ IV PUSH: CPT

## 2024-12-20 PROCEDURE — 2500000004 HC RX 250 GENERAL PHARMACY W/ HCPCS (ALT 636 FOR OP/ED)

## 2024-12-20 PROCEDURE — 2500000001 HC RX 250 WO HCPCS SELF ADMINISTERED DRUGS (ALT 637 FOR MEDICARE OP)

## 2024-12-20 RX ORDER — PANTOPRAZOLE SODIUM 20 MG/1
20 TABLET, DELAYED RELEASE ORAL DAILY
Qty: 20 TABLET | Refills: 0 | Status: SHIPPED | OUTPATIENT
Start: 2024-12-20 | End: 2025-01-09

## 2024-12-20 RX ADMIN — FAMOTIDINE 20 MG: 20 TABLET ORAL at 00:48

## 2024-12-20 RX ADMIN — ONDANSETRON 4 MG: 2 INJECTION INTRAMUSCULAR; INTRAVENOUS at 00:48

## 2024-12-20 RX ADMIN — PANTOPRAZOLE SODIUM 40 MG: 40 TABLET, DELAYED RELEASE ORAL at 00:48

## 2024-12-20 ASSESSMENT — HEART SCORE
RISK FACTORS: >2 RISK FACTORS OR HX OF ATHEROSCLEROTIC DISEASE
TROPONIN: LESS THAN OR EQUAL TO NORMAL LIMIT
HEART SCORE: 3
AGE: 45-64
ECG: NORMAL
HISTORY: SLIGHTLY SUSPICIOUS

## 2024-12-20 NOTE — ED PROVIDER NOTES
Emergency Department Provider Note        History of Present Illness     History provided by: Patient  Limitations to History: None  External Records Reviewed with Brief Summary: None    HPI:  Merced Allan is a 64 y.o. female with hypertension, hyperlipidemia, diabetes, GERD, anxiety presenting for chest pain.  Symptoms occur nightly and feels like a burning pain in the center of her chest.  She has been evaluated for this by her primary care doctor as well as a cardiologist, Dr. Batres.  She had a recent stress test and CT calcium score which were both normal.  Her primary care doctor prescribed her Lexapro and a PPI.  However, she has not been taking the PPI.  She is concerned about interactions with her Lexapro.  She is also been inconsistent with her Lexapro use.  She presents today because the pain in her chest has seemed to persist longer than usual.  She does have history of tobacco use.    Physical Exam   Triage vitals:  T 36.6 °C (97.9 °F)  HR 64  BP (!) 181/81  RR 17  O2 94 % None (Room air)    Physical Exam  Vitals and nursing note reviewed.   Constitutional:       General: She is not in acute distress.     Appearance: She is well-developed.   HENT:      Head: Normocephalic and atraumatic.      Right Ear: External ear normal.      Left Ear: External ear normal.      Nose: Nose normal.      Mouth/Throat:      Mouth: Mucous membranes are moist.   Eyes:      General: No scleral icterus.     Extraocular Movements: Extraocular movements intact.      Conjunctiva/sclera: Conjunctivae normal.      Pupils: Pupils are equal, round, and reactive to light.   Cardiovascular:      Rate and Rhythm: Normal rate and regular rhythm.      Heart sounds: No murmur heard.  Pulmonary:      Effort: Pulmonary effort is normal. No respiratory distress.      Breath sounds: Normal breath sounds.   Abdominal:      Palpations: Abdomen is soft.      Tenderness: There is no abdominal tenderness.   Musculoskeletal:         General:  No swelling.      Cervical back: Neck supple.   Skin:     General: Skin is warm and dry.   Neurological:      General: No focal deficit present.      Mental Status: She is alert.   Psychiatric:         Mood and Affect: Mood normal.          Medical Decision Making & ED Course   Medical Decision Makin y.o. female presents with chest pain.  On arrival, she is afebrile and hemodynamically stable.  She reports that the pain is in the center of her chest and feels like a warm sensation.  This has been going on for many months and occurs at night.  She is not compliant with her PPI that was prescribed for her for GERD, and has also been inconsistent with her Lexapro prescribed to her for anxiety.  We will obtain labwork and imaging to evaluate for myocardial infarction, pneumothorax, esophageal rupture, myocarditis/pericarditis, pneumonia.  Given the atypical nature of her pain as well as history of GERD and noncompliance with PPI, patient is given GI cocktail, famotidine, Protonix.    CBC and CMP unremarkable.  Troponin negative x 2.  Chest x-ray unremarkable.  Upon reevaluation, she reports that her chest pain has resolved.  heart score 3.  We suspect that her symptoms are more likely related to acid reflux rather than cardiac in nature, especially as she sees her cardiologist regularly and had a recent negative CT calcium score.  She does feel comfortable going home and following up with her primary care doctor and cardiologist.  She is given prescription for Protonix.  Home care and return instructions discussed. Patient expressed understanding and agreement. Patient discharged in stable condition.    Karlos Lockhart DO, PGY-4  Emergency Medicine Resident  ----  Scoring Tools Utilized: HEART Score: 3        Social Determinants of Health which Significantly Impact Care: None identified     EKG Independent Interpretation: EKG interpreted by myself. Please see ED Course for full interpretation.    Independent Result  Review and Interpretation: Relevant laboratory and radiographic results were reviewed and independently interpreted by myself.  As necessary, they are commented on in the ED Course.    Chronic conditions affecting the patient's care: As documented above in [facility]    The patient was discussed with the following consultants/services: None    Care Considerations: As documented above in [facility]    ED Course:  ED Course as of 12/20/24 0089   u Dec 19, 2024   2340 Requested Protonix for heartburn symptoms.  Does not like Prevacid.  Would like a prescription for Protonix.  Reports she is not allergic to it  [FN]   2340 Patient reports a normal calcium score and normal stress test few months ago with her cardiologist at [facility]. [FN]      ED Course User Index  [FN] Lilly Jara MD         Diagnoses as of 12/20/24 1339   Heartburn   Chest pain, unspecified type     Disposition   As a result of the work-up, the patient was discharged home.  she was informed of her diagnosis and instructed to come back with any concerns or worsening of condition.  she and was agreeable to the plan as discussed above.  she was given the opportunity to ask questions.  All of the patient's questions were answered.    Procedures   Procedures    Patient seen and discussed with ED attending physician.    Karlos Lockhart DO  Emergency Medicine     Karlos Lockhart DO  Resident  12/20/24 7284

## 2024-12-20 NOTE — DISCHARGE INSTRUCTIONS
Please continue to take your blood pressure medications as prescribed.  Take the Protonix every day as prescribed as well for your heartburn.  Follow-up with your primary care doctor regarding your blood pressure and see if you need more medications for it.  Return to the emergency department if you have changes in your symptoms or any questions or concerns.

## 2024-12-21 LAB
ATRIAL RATE: 65 BPM
P AXIS: 33 DEGREES
P OFFSET: 190 MS
P ONSET: 143 MS
PR INTERVAL: 156 MS
Q ONSET: 221 MS
QRS COUNT: 11 BEATS
QRS DURATION: 80 MS
QT INTERVAL: 418 MS
QTC CALCULATION(BAZETT): 434 MS
QTC FREDERICIA: 429 MS
R AXIS: 29 DEGREES
T AXIS: 50 DEGREES
T OFFSET: 430 MS
VENTRICULAR RATE: 65 BPM

## 2024-12-23 ENCOUNTER — TELEPHONE (OUTPATIENT)
Dept: GASTROENTEROLOGY | Facility: HOSPITAL | Age: 64
End: 2024-12-23
Payer: COMMERCIAL

## 2024-12-23 NOTE — TELEPHONE ENCOUNTER
Left voicemail to schedule patient with either Dr. Parham or Dr. Mayfield per message from Marcela NGUYỄN And Jacqueline.

## 2024-12-26 LAB
ATRIAL RATE: 71 BPM
P AXIS: 30 DEGREES
P OFFSET: 192 MS
P ONSET: 148 MS
PR INTERVAL: 142 MS
Q ONSET: 219 MS
QRS COUNT: 12 BEATS
QRS DURATION: 84 MS
QT INTERVAL: 402 MS
QTC CALCULATION(BAZETT): 436 MS
QTC FREDERICIA: 425 MS
R AXIS: 22 DEGREES
T AXIS: 48 DEGREES
T OFFSET: 420 MS
VENTRICULAR RATE: 71 BPM

## 2024-12-28 DIAGNOSIS — F32.0 CURRENT MILD EPISODE OF MAJOR DEPRESSIVE DISORDER WITHOUT PRIOR EPISODE (CMS-HCC): Primary | ICD-10-CM

## 2024-12-30 RX ORDER — ESCITALOPRAM OXALATE 5 MG/1
5 TABLET ORAL DAILY
Qty: 90 TABLET | Refills: 0 | Status: SHIPPED | OUTPATIENT
Start: 2024-12-30

## 2025-03-05 NOTE — PROGRESS NOTES
"Gastroenterology Clinic Consult Note    Reason For Consult  \"Acid reflux, difficulty swallowing, diarrhea\"    History Of Present Illness  Merced Allan is a 64 y.o. female with a past medical history of HTN, HLD, T2DM, anxiety who presents to GI clinic for \"Acid reflux, difficulty swallowing, diarrhea\"    Has previously been seen by GI for same chief complaints. Initially in 2020 for dysphagia and diarrhea for which EGD/colonoscopy were ordered by  not done. She was seen again in 2021 to re-establish care and was recommended to take PPI, and have barium esophagram and EGD/colonoscopy done. She only followed through with the latter. 8/2022 EGD showed gastritis, biopsies negative for Hpylori. Colonoscopy showed several polyps, some of which were tubular adenomas, recommended repeat was in years.    Has has multiple ER visits since 11/2024 for chest pain without clear cardiac cause.  Both 8/2024 AND 9/2024 CT calcium score was 0.    Within 10 minutes of eating meal, would have diarrhea with fecal incontinence. Now is on lexapro and thinks this is making her more constipated so no more diarrhea. Having thin caliber stools, sometimes hard. Has one BM daily still occasional having accidents for the last 1 year.    Feels reflux and heartburn every 2-3 days that goes away after 2 sips of water. Does have sensations of regurgitation and SOB in the middle of the night with coughing. Feels like she gets food and water stuck in the mid neck which is new for the last 6 months. Has changes in her tastebuds. Has lost 10-15lbs in the last year after losing her . She says she has a friend who had very similar symptoms and was diagnosed with esophagus cancer, and she is worried about the same for herself.    No abdominal pain     Past Medical History  No past medical history on file.    Surgical History  Past Surgical History:   Procedure Laterality Date    OTHER SURGICAL HISTORY  08/12/2019    Surgery    OTHER SURGICAL " HISTORY  08/12/2019    Shoulder surgery    OTHER SURGICAL HISTORY  08/12/2019    Appendectomy    OTHER SURGICAL HISTORY  08/12/2019    Colposcopy    OTHER SURGICAL HISTORY  08/12/2019    Cholecystectomy    OTHER SURGICAL HISTORY  08/12/2019    Hysterectomy       Social History  Social Drivers of Health     Tobacco Use: Medium Risk (12/24/2024)    Received from [facility]    Patient History     Smoking Tobacco Use: Former     Smokeless Tobacco Use: Never     Passive Exposure: Not on file   Alcohol Use: Not on file   Financial Resource Strain: Not on file   Food Insecurity: Not on file   Transportation Needs: Not on file   Physical Activity: Not on file   Stress: Not on file   Social Connections: Not on file   Intimate Partner Violence: Not on file   Depression: At risk (12/5/2024)    PHQ-2     PHQ-2 Score: 6   Housing Stability: Not on file   Utilities: Not on file   Digital Equity: Not on file   Health Literacy: Not on file       Family History  No family hx esophagus or colon cancer     Allergies  No Known Allergies      Home Medications    Current Outpatient Medications:     amLODIPine (Norvasc) 5 mg tablet, Take 1.5 tablets (7.5 mg) by mouth once daily., Disp: 135 tablet, Rfl: 1    blood sugar diagnostic (OneTouch Verio test strips) strip, USE TO TEST ONCE DAILY, Disp: 100 strip, Rfl: 3    cyanocobalamin (Vitamin B-12) 1,000 mcg tablet, Take by mouth., Disp: , Rfl:     escitalopram (Lexapro) 5 mg tablet, TAKE 1 TABLET BY MOUTH EVERY DAY, Disp: 90 tablet, Rfl: 0    magnesium glycinate 100 mg magnesium capsule, Take by mouth., Disp: , Rfl:     metoprolol succinate XL (Toprol-XL) 25 mg 24 hr tablet, Take 1 tablet (25 mg) by mouth once daily., Disp: 90 tablet, Rfl: 3    potassium chloride CR 10 mEq ER tablet, Take by mouth., Disp: , Rfl:     hydrOXYzine HCL (Atarax) 10 mg tablet, Take 1 tablet (10 mg) by mouth 3 times a day. (Patient not taking: Reported on 12/5/2024), Disp: 30 tablet, Rfl: 3    lansoprazole  (Prevacid) 30 mg DR capsule, Take 1 capsule (30 mg) by mouth once daily in the morning. Take before meals. (Patient not taking: Reported on 3/6/2025), Disp: 30 capsule, Rfl: 3    nicotine (Nicoderm CQ) 7 mg/24 hr patch, PLACE 1 PATCH OVER 24 HOURS ON THE SKIN ONCE EVERY 24 HOURS FOR 28 DAYS., Disp: 28 patch, Rfl: 0    nitroglycerin (Nitrostat) 0.4 mg SL tablet, Place 1 tablet (0.4 mg) under the tongue every 5 minutes if needed for chest pain. (Patient not taking: Reported on 3/6/2025), Disp: 30 tablet, Rfl: 0    omeprazole (PriLOSEC) 20 mg DR capsule, Take 1 capsule (20 mg) by mouth once daily in the morning. Take before meals. Do not crush or chew., Disp: 30 capsule, Rfl: 11    pantoprazole (ProtoNix) 20 mg EC tablet, Take 1 tablet (20 mg) by mouth once daily for 20 days. Do not crush, chew, or split., Disp: 20 tablet, Rfl: 0    Review of Systems  As above     Last Recorded Vitals  Blood pressure 135/81, pulse 72, temperature 36.2 °C (97.2 °F), temperature source Temporal, resp. rate 18, weight 66.2 kg (146 lb), SpO2 96%.    Physical Exam  General: well-nourished, no acute distress  HEENT: PERRLA, EOM intact, no scleral icterus, moist MM  Respiratory: CTA bilaterally, normal work of breathing  Cardiovascular: RRR, no murmurs/rubs/gallops  Abdomen: Soft, nontender, nondistended, bowel sounds present, no masses palpated, no organomeagly  Extremities: no edema, no asterixis  Neuro: alert and oriented, CNII-XII grossly intact, moves all 4 extremities with no focal deficits      Relevant Results    Current Outpatient Medications:     amLODIPine (Norvasc) 5 mg tablet, Take 1.5 tablets (7.5 mg) by mouth once daily., Disp: 135 tablet, Rfl: 1    blood sugar diagnostic (OneTouch Verio test strips) strip, USE TO TEST ONCE DAILY, Disp: 100 strip, Rfl: 3    cyanocobalamin (Vitamin B-12) 1,000 mcg tablet, Take by mouth., Disp: , Rfl:     escitalopram (Lexapro) 5 mg tablet, TAKE 1 TABLET BY MOUTH EVERY DAY, Disp: 90 tablet, Rfl:  0    magnesium glycinate 100 mg magnesium capsule, Take by mouth., Disp: , Rfl:     metoprolol succinate XL (Toprol-XL) 25 mg 24 hr tablet, Take 1 tablet (25 mg) by mouth once daily., Disp: 90 tablet, Rfl: 3    potassium chloride CR 10 mEq ER tablet, Take by mouth., Disp: , Rfl:     hydrOXYzine HCL (Atarax) 10 mg tablet, Take 1 tablet (10 mg) by mouth 3 times a day. (Patient not taking: Reported on 2024), Disp: 30 tablet, Rfl: 3    lansoprazole (Prevacid) 30 mg DR capsule, Take 1 capsule (30 mg) by mouth once daily in the morning. Take before meals. (Patient not taking: Reported on 3/6/2025), Disp: 30 capsule, Rfl: 3    nicotine (Nicoderm CQ) 7 mg/24 hr patch, PLACE 1 PATCH OVER 24 HOURS ON THE SKIN ONCE EVERY 24 HOURS FOR 28 DAYS., Disp: 28 patch, Rfl: 0    nitroglycerin (Nitrostat) 0.4 mg SL tablet, Place 1 tablet (0.4 mg) under the tongue every 5 minutes if needed for chest pain. (Patient not taking: Reported on 3/6/2025), Disp: 30 tablet, Rfl: 0    omeprazole (PriLOSEC) 20 mg DR capsule, Take 1 capsule (20 mg) by mouth once daily in the morning. Take before meals. Do not crush or chew., Disp: 30 capsule, Rfl: 11    pantoprazole (ProtoNix) 20 mg EC tablet, Take 1 tablet (20 mg) by mouth once daily for 20 days. Do not crush, chew, or split., Disp: 20 tablet, Rfl: 0     Lab Results   Component Value Date    WBC 9.6 2024    HGB 12.9 2024    HCT 38.7 2024    MCV 88 2024     2024     Lab Results   Component Value Date    GLUCOSE 142 (H) 2024    CALCIUM 9.1 2024     2024    K 3.4 (L) 2024    CO2 24 2024     2024    BUN 23 2024    CREATININE 0.81 2024     Lab Results   Component Value Date    ALT 12 2024    AST 12 2024    ALKPHOS 48 2024    BILITOT 0.3 2024       Imagin2024 CT abdomen pelvis  IMPRESSION:  1. Findings compatible with mild acute pancreatitis involving the  uncinate process of  pancreas. No pseudocyst or abscess. No underlying  pancreatic mass identified.  2. Mild biliary prominence with the common bile duct measuring up to  1.2 cm without definite distal common duct calculus. Could consider  further evaluation with MRCP.  3. Small hiatal hernia.  4. Mild hepatic steatosis.  5. 1.2 cm left adrenal nodule, statistically likely represent an  adenoma.    Procedures:    8/31/2022 Colonoscopy  Impression:            - One 4 mm polyp in the cecum, removed with a cold                          snare. Resected and retrieved.                         - Five 3 to 8 mm polyps in the transverse colon,                          removed with a cold snare. Resected and retrieved.                         - One 3 mm polyp in the transverse colon, removed with                          a cold biopsy forceps. Resected and retrieved.                         - One 2 mm polyp in the descending colon, removed with                          a cold biopsy forceps. Resected and retrieved.                         - One 8 mm polyp in the sigmoid colon, removed with a                          cold snare. Resected and retrieved.                         - Internal hemorrhoids.  Recommendation:        - Patient has a contact number available for                          emergencies. The signs and symptoms of potential                          delayed complications were discussed with the patient.                          Return to normal activities tomorrow. Written                          discharge instructions were provided to the patient.                         - Resume previous diet.                         - Continue present medications.                         - Await pathology results.                         - Repeat colonoscopy in 3 years for surveillance.    8/31/2022 EGD (indication: dysphagia)  Findings:       The examined esophagus was normal.       The Z-line was regular and was found 40 cm from the  incisors.       Striped moderately erythematous mucosa without bleeding was found in the        gastric body and in the gastric antrum. Biopsies were taken with a cold        forceps for Helicobacter pylori testing.       The examined duodenum was normal.  Moderate Sedation:       See anesthesia note  Estimated Blood Loss:       Estimated blood loss was minimal.  Impression:            - Normal esophagus.                         - Z-line regular, 40 cm from the incisors.                         - Erythematous mucosa in the gastric body and antrum.                          Biopsied.                         - Normal examined duodenum.    Findings:       The examined esophagus was normal.       The Z-line was regular and was found 40 cm from the incisors.       Striped moderately erythematous mucosa without bleeding was found in the        gastric body and in the gastric antrum. Biopsies were taken with a cold        forceps for Helicobacter pylori testing.       The examined duodenum was normal.    Impression:            - Normal esophagus.                         - Z-line regular, 40 cm from the incisors.                         - Erythematous mucosa in the gastric body and antrum.                          Biopsied.                         - Normal examined duodenum.    FINAL DIAGNOSIS  A.  STOMACH, RANDOM BIOPSY:  --OXYNTIC AND ANTRAL MUCOSA WITH NO SIGNIFICANT HISTOPATHOLOGICAL  ABNORMALITIES.  --HELICOBACTER IS NOT IDENTIFIED.    B.  CECUM, POLYPECTOMY:  --FRAGMENTS OF TUBULAR ADENOMA.    C.  RIGHT COLON, RANDOM BIOPSIES:  --COLONIC MUCOSA, NO SIGNIFICANT HISTOPATHOLOGICAL ABNORMALITIES.    D.  TRANSVERSE COLON, POLYPECTOMIES:  --FRAGMENTS OF TUBULAR ADENOMA.    E.  LEFT COLON, RANDOM BIOPSIES:  --COLONIC MUCOSA, NO SIGNIFICANT HISTOPATHOLOGICAL ABNORMALITIES.    F.  DESCENDING COLON, POLYPECTOMY:  --TUBULAR ADENOMA.    G.  SIGMOID COLON, POLYPECTOMY:  --FRAGMENTS OF TUBULAR ADENOMA.        "  ASSESSMENT/PLAN:    Merced Allan is a 64 y.o. female with a past medical history of HTN, HLD, T2DM, anxiety who presents to GI clinic for \"Acid reflux, difficulty swallowing, diarrhea\"    Not currently on any acid-suppressing medication, but the symptoms of heartburn, reflux, and taste changes are suggestive of ongoing acid reflux. Will restart omeprazole 20mg once daily.     She is due now for surveillance colonoscopy    She has new dysphagia and recent weight loss with prior smoking history. Will plan to repeat EGD at time of colonoscopy to rule out malignancy though this is low likelihood given no evidence of BE or malignancy on EGD 3 years ago    Plan:  -EGD/colonoscopy with Dr. Kennedy at [place]  -Start omeprazole 20mg once daily on empty stomach  -Start OTC fiber supplementation for stool bulking- pills or gummies per patient preference  -RTC 4-6 months    I spent 43 minutes in the professional and overall care of this patient.    Rupal Galarza MD    Patient was seen and discussed with Dr. Kennedy    "

## 2025-03-06 ENCOUNTER — OFFICE VISIT (OUTPATIENT)
Dept: GASTROENTEROLOGY | Facility: HOSPITAL | Age: 65
End: 2025-03-06
Payer: COMMERCIAL

## 2025-03-06 VITALS
BODY MASS INDEX: 25.06 KG/M2 | TEMPERATURE: 97.2 F | OXYGEN SATURATION: 96 % | HEART RATE: 72 BPM | DIASTOLIC BLOOD PRESSURE: 81 MMHG | WEIGHT: 146 LBS | SYSTOLIC BLOOD PRESSURE: 135 MMHG | RESPIRATION RATE: 18 BRPM

## 2025-03-06 DIAGNOSIS — R13.10 DYSPHAGIA, UNSPECIFIED TYPE: ICD-10-CM

## 2025-03-06 DIAGNOSIS — R19.7 DIARRHEA, UNSPECIFIED TYPE: Primary | ICD-10-CM

## 2025-03-06 DIAGNOSIS — R12 HEARTBURN: ICD-10-CM

## 2025-03-06 RX ORDER — OMEPRAZOLE 20 MG/1
20 CAPSULE, DELAYED RELEASE ORAL
Qty: 30 CAPSULE | Refills: 11 | Status: SHIPPED | OUTPATIENT
Start: 2025-03-06 | End: 2026-03-06

## 2025-03-06 ASSESSMENT — PAIN SCALES - GENERAL: PAINLEVEL_OUTOF10: 0-NO PAIN

## 2025-03-06 NOTE — PATIENT INSTRUCTIONS
Thank you for coming to GI clinic today.  Start taking omeprazole 20mg once daily on an empty stomach. Take this no matter what  Purchase some fiber supplements/gummies over the counter and take as directed on the bottle. See if this helps with your stool incontinence  Schedule your EGD/Colonoscopy  Come back to see me in 4-6 months

## 2025-03-30 DIAGNOSIS — F32.0 CURRENT MILD EPISODE OF MAJOR DEPRESSIVE DISORDER WITHOUT PRIOR EPISODE (CMS-HCC): ICD-10-CM

## 2025-03-31 RX ORDER — ESCITALOPRAM OXALATE 5 MG/1
5 TABLET ORAL DAILY
Qty: 90 TABLET | Refills: 0 | Status: SHIPPED | OUTPATIENT
Start: 2025-03-31

## 2025-04-01 ENCOUNTER — TELEPHONE (OUTPATIENT)
Dept: PRIMARY CARE | Facility: CLINIC | Age: 65
End: 2025-04-01
Payer: COMMERCIAL

## 2025-04-01 NOTE — TELEPHONE ENCOUNTER
Patient would like Lu to give her a call so she may update her on something she would like to discuss at her appt tomorrow.    Patient can be reached at [phone]      Please advise

## 2025-04-02 ENCOUNTER — APPOINTMENT (OUTPATIENT)
Dept: PRIMARY CARE | Facility: CLINIC | Age: 65
End: 2025-04-02
Payer: COMMERCIAL

## 2025-04-02 VITALS
RESPIRATION RATE: 16 BRPM | BODY MASS INDEX: 25.64 KG/M2 | SYSTOLIC BLOOD PRESSURE: 110 MMHG | OXYGEN SATURATION: 97 % | HEART RATE: 75 BPM | WEIGHT: 150.2 LBS | TEMPERATURE: 97.8 F | HEIGHT: 64 IN | DIASTOLIC BLOOD PRESSURE: 60 MMHG

## 2025-04-02 DIAGNOSIS — J43.8 OTHER EMPHYSEMA (MULTI): ICD-10-CM

## 2025-04-02 DIAGNOSIS — I10 ESSENTIAL HYPERTENSION: ICD-10-CM

## 2025-04-02 DIAGNOSIS — E11.00 TYPE 2 DIABETES MELLITUS WITH HYPEROSMOLARITY WITHOUT COMA, WITHOUT LONG-TERM CURRENT USE OF INSULIN (MULTI): Primary | ICD-10-CM

## 2025-04-02 DIAGNOSIS — F33.3 SEVERE EPISODE OF RECURRENT MAJOR DEPRESSIVE DISORDER, WITH PSYCHOTIC FEATURES (MULTI): ICD-10-CM

## 2025-04-02 LAB — POC HEMOGLOBIN A1C: 6 % (ref 4.2–6.5)

## 2025-04-02 PROCEDURE — 3074F SYST BP LT 130 MM HG: CPT | Performed by: INTERNAL MEDICINE

## 2025-04-02 PROCEDURE — 99213 OFFICE O/P EST LOW 20 MIN: CPT | Performed by: INTERNAL MEDICINE

## 2025-04-02 PROCEDURE — 3078F DIAST BP <80 MM HG: CPT | Performed by: INTERNAL MEDICINE

## 2025-04-02 PROCEDURE — 83036 HEMOGLOBIN GLYCOSYLATED A1C: CPT | Performed by: INTERNAL MEDICINE

## 2025-04-02 PROCEDURE — 3008F BODY MASS INDEX DOCD: CPT | Performed by: INTERNAL MEDICINE

## 2025-04-02 PROCEDURE — 1036F TOBACCO NON-USER: CPT | Performed by: INTERNAL MEDICINE

## 2025-04-02 ASSESSMENT — PATIENT HEALTH QUESTIONNAIRE - PHQ9
SUM OF ALL RESPONSES TO PHQ9 QUESTIONS 1 AND 2: 2
1. LITTLE INTEREST OR PLEASURE IN DOING THINGS: SEVERAL DAYS
10. IF YOU CHECKED OFF ANY PROBLEMS, HOW DIFFICULT HAVE THESE PROBLEMS MADE IT FOR YOU TO DO YOUR WORK, TAKE CARE OF THINGS AT HOME, OR GET ALONG WITH OTHER PEOPLE: NOT DIFFICULT AT ALL
2. FEELING DOWN, DEPRESSED OR HOPELESS: SEVERAL DAYS

## 2025-04-02 ASSESSMENT — ENCOUNTER SYMPTOMS
VOMITING: 0
SLEEP DISTURBANCE: 0
CHILLS: 0
FATIGUE: 0
WHEEZING: 0
ARTHRALGIAS: 0
SHORTNESS OF BREATH: 0
UNEXPECTED WEIGHT CHANGE: 0
DIZZINESS: 0
ABDOMINAL PAIN: 0
JOINT SWELLING: 0
DIARRHEA: 0
SORE THROAT: 0
DYSURIA: 0
COUGH: 0
CONFUSION: 0
CONSTIPATION: 0
NAUSEA: 0
PALPITATIONS: 0
ADENOPATHY: 0
WEAKNESS: 0
CHEST TIGHTNESS: 0

## 2025-04-02 NOTE — PROGRESS NOTES
Subjective   Merced Allan is a 64 y.o. female who presents for Follow-up (Follow  up DM).  3 months  follow  up DM,last HGBA1C was 5.8 on 11.25.24,today  is  6.0  checking BG  at  home    Needs referral  for eyeglasses  Patient  states had  the  eye visit  for DM  3-4 months  ago ,  will need report  Colonoscopy- scheduled  for 5.1.2025      Review of Systems   Constitutional:  Negative for chills, fatigue and unexpected weight change.        Comment   HENT:  Negative for congestion, ear pain and sore throat.    Respiratory:  Negative for cough, chest tightness, shortness of breath and wheezing.    Cardiovascular:  Negative for palpitations and leg swelling.   Gastrointestinal:  Negative for abdominal pain, constipation, diarrhea, nausea and vomiting.   Genitourinary:  Negative for dysuria and urgency.   Musculoskeletal:  Negative for arthralgias and joint swelling.   Skin:  Negative for rash.   Neurological:  Negative for dizziness and weakness.   Hematological:  Negative for adenopathy.   Psychiatric/Behavioral:  Negative for confusion and sleep disturbance.        Objective   Physical Exam  Constitutional:       Appearance: Normal appearance.   HENT:      Head: Normocephalic and atraumatic.   Eyes:      Pupils: Pupils are equal, round, and reactive to light.   Cardiovascular:      Rate and Rhythm: Normal rate and regular rhythm.   Pulmonary:      Effort: Pulmonary effort is normal.      Breath sounds: Normal breath sounds.   Musculoskeletal:         General: Normal range of motion.      Cervical back: Normal range of motion and neck supple.   Skin:     General: Skin is warm.   Neurological:      General: No focal deficit present.      Mental Status: She is alert and oriented to person, place, and time.   Psychiatric:         Mood and Affect: Mood normal.         Behavior: Behavior normal.       /60 (BP Location: Left arm, Patient Position: Sitting)   Pulse 75   Temp 36.6 °C (97.8 °F)   Resp 16   Ht  "1.626 m (5' 4\")   Wt 68.1 kg (150 lb 3.2 oz)   SpO2 97%   BMI 25.78 kg/m²       Assessment/Plan   Problem List Items Addressed This Visit       Diabetes mellitus, type 2 (Multi) - Primary    Relevant Orders    POCT glycosylated hemoglobin (Hb A1C) manually resulted (Completed)    Essential hypertension    Severe episode of recurrent major depressive disorder, with psychotic features (Multi)     Condition stable, controlled with current medication, no medication side effects, continue same treatment,call if any  new symptoms develops. Follow up with specialty service as needed or advised.           Other emphysema (Multi)     Condition stable, controlled with current medication, no medication side effects, continue same treatment,call if any  new symptoms develops. Follow up with specialty service as needed or advised.                "

## 2025-04-30 DIAGNOSIS — Z12.11 COLON CANCER SCREENING: Primary | ICD-10-CM

## 2025-04-30 RX ORDER — POLYETHYLENE GLYCOL 3350, SODIUM CHLORIDE, SODIUM BICARBONATE, POTASSIUM CHLORIDE 420; 11.2; 5.72; 1.48 G/4L; G/4L; G/4L; G/4L
4000 POWDER, FOR SOLUTION ORAL ONCE
Qty: 4000 ML | Refills: 0 | Status: SHIPPED | OUTPATIENT
Start: 2025-04-30 | End: 2025-05-02 | Stop reason: ALTCHOICE

## 2025-05-01 ENCOUNTER — APPOINTMENT (OUTPATIENT)
Dept: OPERATING ROOM | Facility: CLINIC | Age: 65
End: 2025-05-01
Payer: COMMERCIAL

## 2025-05-02 ENCOUNTER — ANESTHESIA EVENT (OUTPATIENT)
Dept: GASTROENTEROLOGY | Facility: EXTERNAL LOCATION | Age: 65
End: 2025-05-02

## 2025-05-02 ENCOUNTER — ANESTHESIA (OUTPATIENT)
Dept: GASTROENTEROLOGY | Facility: EXTERNAL LOCATION | Age: 65
End: 2025-05-02

## 2025-05-02 ENCOUNTER — HOSPITAL ENCOUNTER (OUTPATIENT)
Dept: GASTROENTEROLOGY | Facility: EXTERNAL LOCATION | Age: 65
Discharge: HOME | End: 2025-05-02
Payer: COMMERCIAL

## 2025-05-02 VITALS
OXYGEN SATURATION: 96 % | SYSTOLIC BLOOD PRESSURE: 136 MMHG | HEART RATE: 68 BPM | BODY MASS INDEX: 25.61 KG/M2 | TEMPERATURE: 97.5 F | RESPIRATION RATE: 14 BRPM | WEIGHT: 150 LBS | HEIGHT: 64 IN | DIASTOLIC BLOOD PRESSURE: 82 MMHG

## 2025-05-02 DIAGNOSIS — Z12.11 COLON CANCER SCREENING: ICD-10-CM

## 2025-05-02 DIAGNOSIS — R19.7 DIARRHEA, UNSPECIFIED TYPE: ICD-10-CM

## 2025-05-02 PROCEDURE — 43239 EGD BIOPSY SINGLE/MULTIPLE: CPT | Performed by: INTERNAL MEDICINE

## 2025-05-02 RX ORDER — SODIUM, POTASSIUM,MAG SULFATES 17.5-3.13G
SOLUTION, RECONSTITUTED, ORAL ORAL
Qty: 2 EACH | Refills: 0 | Status: SHIPPED | OUTPATIENT
Start: 2025-05-02

## 2025-05-02 RX ORDER — SODIUM CHLORIDE 9 MG/ML
INJECTION, SOLUTION INTRAVENOUS CONTINUOUS PRN
Status: DISCONTINUED | OUTPATIENT
Start: 2025-05-02 | End: 2025-05-02

## 2025-05-02 RX ORDER — LIDOCAINE HYDROCHLORIDE 20 MG/ML
INJECTION, SOLUTION INFILTRATION; PERINEURAL AS NEEDED
Status: DISCONTINUED | OUTPATIENT
Start: 2025-05-02 | End: 2025-05-02

## 2025-05-02 RX ORDER — PROPOFOL 10 MG/ML
INJECTION, EMULSION INTRAVENOUS AS NEEDED
Status: DISCONTINUED | OUTPATIENT
Start: 2025-05-02 | End: 2025-05-02

## 2025-05-02 RX ORDER — ONDANSETRON HYDROCHLORIDE 2 MG/ML
4 INJECTION, SOLUTION INTRAVENOUS ONCE AS NEEDED
Status: CANCELLED | OUTPATIENT
Start: 2025-05-02

## 2025-05-02 RX ADMIN — PROPOFOL 50 MG: 10 INJECTION, EMULSION INTRAVENOUS at 10:48

## 2025-05-02 RX ADMIN — SODIUM CHLORIDE: 9 INJECTION, SOLUTION INTRAVENOUS at 10:47

## 2025-05-02 RX ADMIN — LIDOCAINE HYDROCHLORIDE 2.5 ML: 20 INJECTION, SOLUTION INFILTRATION; PERINEURAL at 10:47

## 2025-05-02 RX ADMIN — PROPOFOL 50 MG: 10 INJECTION, EMULSION INTRAVENOUS at 10:50

## 2025-05-02 RX ADMIN — PROPOFOL 100 MG: 10 INJECTION, EMULSION INTRAVENOUS at 10:47

## 2025-05-02 RX ADMIN — PROPOFOL 50 MG: 10 INJECTION, EMULSION INTRAVENOUS at 10:52

## 2025-05-02 SDOH — HEALTH STABILITY: MENTAL HEALTH: CURRENT SMOKER: 0

## 2025-05-02 ASSESSMENT — PAIN SCALES - GENERAL
PAINLEVEL_OUTOF10: 0 - NO PAIN
PAINLEVEL_OUTOF10: 0 - NO PAIN
PAIN_LEVEL: 0

## 2025-05-02 ASSESSMENT — COLUMBIA-SUICIDE SEVERITY RATING SCALE - C-SSRS
6. HAVE YOU EVER DONE ANYTHING, STARTED TO DO ANYTHING, OR PREPARED TO DO ANYTHING TO END YOUR LIFE?: NO
1. IN THE PAST MONTH, HAVE YOU WISHED YOU WERE DEAD OR WISHED YOU COULD GO TO SLEEP AND NOT WAKE UP?: NO
2. HAVE YOU ACTUALLY HAD ANY THOUGHTS OF KILLING YOURSELF?: NO

## 2025-05-02 ASSESSMENT — PAIN - FUNCTIONAL ASSESSMENT
PAIN_FUNCTIONAL_ASSESSMENT: 0-10

## 2025-05-02 NOTE — ANESTHESIA POSTPROCEDURE EVALUATION
Patient: Merced Peoplesugphilippe    Procedure Summary       Date: 05/02/25 Room / Location: [facility]    Anesthesia Start: 1044 Anesthesia Stop:     Procedure: EGD Diagnosis:       Diarrhea, unspecified type      Esophageal dysphagia    Scheduled Providers: Victor Hugo Arriaza MD Responsible Provider: DANIEL Phillips    Anesthesia Type: MAC ASA Status: 3            Anesthesia Type: MAC    Vitals Value Taken Time   /77 05/02/25 11:00   Temp 36.4 05/02/25 11:00   Pulse 77 05/02/25 11:00   Resp 22 05/02/25 11:00   SpO2 94 05/02/25 11:00       Anesthesia Post Evaluation    Patient location during evaluation: bedside  Patient participation: complete - patient participated  Level of consciousness: sedated  Pain score: 0  Pain management: adequate  Airway patency: patent  Cardiovascular status: acceptable  Respiratory status: acceptable  Hydration status: acceptable  Postoperative Nausea and Vomiting: none        No notable events documented.

## 2025-05-02 NOTE — DISCHARGE INSTRUCTIONS
Patient Instructions Post Procedure      The anesthetics, sedatives or narcotics which were given to you today will be acting in your body for the next 24 hours, so you might feel a little sleepy or groggy.  This feeling should slowly wear off. Carefully read and follow the instructions.     You received sedation today:  - Do not drive or operate any machinery or power tools of any kind.   - No alcoholic beverages today, not even beer or wine.  - Do not make any important decisions or sign any legal documents.  - No over the counter medications that contain alcohol or that may cause drowsiness.    While it is common to experience mild to moderate abdominal distention, gas, or belching after your procedure, if any of these symptoms occur following discharge from the GI Lab or within one week of having your procedure, call the [facility] to be advised whether a visit to your nearest Urgent Care or Emergency Department is indicated.  Take this paper with you if you go.   - If you develop an allergic reaction to the medications that were given during your procedure such as difficulty breathing, rash, hives, severe nausea, vomiting or lightheadedness.  - If you experience chest pain, shortness of breath, severe abdominal pain, fevers and chills.  -If you develop signs and symptoms of bleeding such as blood in your spit, if your stools turn black, tarry, or bloody  - If you have not urinated within 8 hours following your procedure.  - If your IV site becomes painful, red, inflamed, or looks infected.    If you received a biopsy/polypectomy/sphincterotomy the following instructions apply below:  __ Do not use Aspirin containing products, non-steroidal medications or anti-coagulants for one week following your procedure. (Examples of these types of medications are: Advil, Arthrotec, Aleve, Coumadin, Ecotrin, Heparin, Ibuprofen, Indocin, Motrin, Naprosyn, Nuprin, Plavix, Vioxx, and Voltarin, or their generic  forms.  This list is not all-inclusive.  Check with your physician or pharmacist before resuming medications.)   __ Eat a soft diet today.  Avoid foods that are poorly digested for the next 24 hours.  These foods would include: nuts, beans, lettuce, red meats, and fried foods. Start with liquids and advance your diet as tolerated, gradually work up to eating solids.   __ Do not have a Barium Study or Enema for one week.    Your physician recommends the additional following instructions:    -You have a contact number available for emergencies. The signs and symptoms of potential delayed complications were discussed with you. You may return to normal activities tomorrow.  -Resume your previous diet or other if specified.  -Continue your present medications.   -We are waiting for your pathology results, if applicable.  -The findings and recommendations have been discussed with you and/or family.  - Please see Medication Reconciliation Form for new medication/medications prescribed.     If you experience any problems or have any questions following discharge from the GI Lab, please call: [phone] from 7 am- 4:30 pm.  In the event of an emergency please go to the closest Emergency Department or call Dr. Arriaza [phone]

## 2025-05-02 NOTE — H&P
Outpatient Hospital Procedure H&P    Patient Profile-Procedures  Initial Info  Patient Demographics  Name Merced Allan  Date of Birth 1960  MRN [id number]  [place]   [address] [id number] [place] [facility] [address]    Primary Phone Number [phone]  Secondary Phone Number    Dion Mcgovern    Procedure(s):  EGD with dilation  Primary contact name and number   Extended Emergency Contact Information  Primary Emergency Contact: Alyson Allan  Home Phone: [phone]  Relation: Child  Secondary Emergency Contact: EBONI BROTHERS  Mobile Phone: [phone]  Relation: Sibling  Preferred language: English    General Health  Weight   Vitals:    05/02/25 1021   Weight: 68 kg (150 lb)     BMI Body mass index is 25.75 kg/m².    Allergies  RX Allergies[1]    Past Medical History   Medical History[2]    Provider assessment  Diagnosis: Dysphagia, diarrhea    Medication Reviewed - yes  Prior to Admission medications    Medication Sig Start Date End Date Taking? Authorizing Provider   amLODIPine (Norvasc) 5 mg tablet Take 1.5 tablets (7.5 mg) by mouth once daily. 11/25/24  Yes Dion Bahena MD   escitalopram (Lexapro) 5 mg tablet TAKE 1 TABLET BY MOUTH EVERY DAY 3/31/25  Yes Dion Bahena MD   metoprolol succinate XL (Toprol-XL) 25 mg 24 hr tablet Take 1 tablet (25 mg) by mouth once daily. 11/25/24  Yes Dion Bahena MD   omeprazole (PriLOSEC) 20 mg DR capsule Take 1 capsule (20 mg) by mouth once daily in the morning. Take before meals. Do not crush or chew. 3/6/25 3/6/26 Yes Rupal Galarza MD   polyethylene glycol-electrolytes (Nulytely) 420 gram solution Take 4,000 mL by mouth 1 time for 1 dose. 4/30/25 5/2/25 Yes Victor Hugo Arriaza MD   blood sugar diagnostic (OneTouch Verio test strips) strip USE TO TEST ONCE DAILY 4/29/24   Dion Bahena MD   lansoprazole (Prevacid) 30 mg DR capsule Take 1 capsule (30 mg) by mouth once daily in the morning. Take before meals. 12/5/24    Dion Bahena MD   pantoprazole (ProtoNix) 20 mg EC tablet Take 1 tablet (20 mg) by mouth once daily for 20 days. Do not crush, chew, or split. 12/20/24 1/9/25  Lilly Jara MD       Physical Exam  Vitals:    05/02/25 1021   BP: 151/80   Pulse: 64   Resp: 13   Temp: 36 °C (96.8 °F)   SpO2: 98%        General: A&Ox3, NAD.  CV: RRR. No murmur.  Resp: CTA bilaterally. No wheezing, rhonchi or rales.   Extrem: No edema.       Oropharyngeal Classification II (hard and soft palate, upper portion of tonsils and uvula visible)  ASA PS Classification 2  Sedation Plan Deep  Procedure Plan - pre-procedural (re)assesment completed by physician:  discharge/transfer patient when discharge criteria met    Victor Hugo Arriaza MD  5/2/2025 10:40 AM           [1] No Known Allergies  [2]   Past Medical History:  Diagnosis Date    Hyperlipidemia     Hypertension

## 2025-05-02 NOTE — ANESTHESIA PREPROCEDURE EVALUATION
Patient: Merced Allan    Procedure Information       Date/Time: 05/02/25 1030    Scheduled providers: Victor Hugo Arriaza MD    Procedure: EGD    Location: [facility]            Relevant Problems   Anesthesia (within normal limits)      Cardiac   (+) Atypical chest pain   (+) Chest pain   (+) Essential hypertension      Pulmonary   (+) Other emphysema (Multi)   (+) Pneumonia      Neuro   (+) Anxiety   (+) Anxiety disorder due to medical condition   (+) Depression   (+) Major depression   (+) Severe episode of recurrent major depressive disorder, with psychotic features (Multi)      GI   (+) Dysphagia   (+) GERD (gastroesophageal reflux disease)      /Renal (within normal limits)      Liver   (+) Fatty liver   (+) Idiopathic acute pancreatitis without infection or necrosis (HHS-HCC)      Endocrine   (+) Diabetes mellitus, type 2 (Multi)   (+) Thyroid goiter      Hematology (within normal limits)      Musculoskeletal (within normal limits)      HEENT (within normal limits)      ID   (+) Human papilloma virus infection   (+) Pneumonia   (+) Tinea versicolor      Skin   (+) Tinea versicolor      GYN (within normal limits)       Clinical information reviewed:    Allergies  Meds  Problems  Med Hx    Fam Hx          NPO Detail:  No data recorded     Physical Exam    Airway  Mallampati: II  TM distance: >3 FB  Neck ROM: full  Mouth opening: 3 or more finger widths     Cardiovascular   Rhythm: regular  Rate: normal     Dental     (+) upper dentures, lower dentures     Pulmonary Breath sounds clear to auscultation     Abdominal Abdomen: soft  Bowel sounds: normal           Anesthesia Plan    History of general anesthesia?: yes  History of complications of general anesthesia?: no    ASA 3     MAC     The patient is not a current smoker.  Patient was not previously instructed to abstain from smoking on day of procedure.  Education provided regarding risk of obstructive sleep apnea.  intravenous induction    Anesthetic plan and risks discussed with patient.    Plan discussed with CRNA.

## 2025-05-09 ENCOUNTER — ANESTHESIA EVENT (OUTPATIENT)
Dept: GASTROENTEROLOGY | Facility: EXTERNAL LOCATION | Age: 65
End: 2025-05-09

## 2025-05-13 LAB
LABORATORY COMMENT REPORT: NORMAL
PATH REPORT.FINAL DX SPEC: NORMAL
PATH REPORT.GROSS SPEC: NORMAL
PATH REPORT.TOTAL CANCER: NORMAL
RESIDENT REVIEW: NORMAL

## 2025-05-19 ENCOUNTER — APPOINTMENT (OUTPATIENT)
Dept: GASTROENTEROLOGY | Facility: EXTERNAL LOCATION | Age: 65
End: 2025-05-19
Payer: COMMERCIAL

## 2025-05-19 ENCOUNTER — ANESTHESIA (OUTPATIENT)
Dept: GASTROENTEROLOGY | Facility: EXTERNAL LOCATION | Age: 65
End: 2025-05-19

## 2025-05-19 VITALS
WEIGHT: 150 LBS | HEIGHT: 64 IN | SYSTOLIC BLOOD PRESSURE: 132 MMHG | TEMPERATURE: 97 F | OXYGEN SATURATION: 98 % | HEART RATE: 67 BPM | BODY MASS INDEX: 25.61 KG/M2 | RESPIRATION RATE: 16 BRPM | DIASTOLIC BLOOD PRESSURE: 70 MMHG

## 2025-05-19 DIAGNOSIS — R19.7 DIARRHEA, UNSPECIFIED TYPE: ICD-10-CM

## 2025-05-19 RX ORDER — PROPOFOL 10 MG/ML
INJECTION, EMULSION INTRAVENOUS AS NEEDED
Status: DISCONTINUED | OUTPATIENT
Start: 2025-05-19 | End: 2025-05-19

## 2025-05-19 RX ORDER — SODIUM CHLORIDE 9 MG/ML
INJECTION, SOLUTION INTRAVENOUS CONTINUOUS PRN
Status: DISCONTINUED | OUTPATIENT
Start: 2025-05-19 | End: 2025-05-19

## 2025-05-19 RX ORDER — ONDANSETRON HYDROCHLORIDE 2 MG/ML
4 INJECTION, SOLUTION INTRAVENOUS ONCE AS NEEDED
Status: DISCONTINUED | OUTPATIENT
Start: 2025-05-19 | End: 2025-05-20 | Stop reason: HOSPADM

## 2025-05-19 RX ORDER — LIDOCAINE HYDROCHLORIDE 20 MG/ML
INJECTION, SOLUTION INFILTRATION; PERINEURAL AS NEEDED
Status: DISCONTINUED | OUTPATIENT
Start: 2025-05-19 | End: 2025-05-19

## 2025-05-19 RX ADMIN — PROPOFOL 50 MG: 10 INJECTION, EMULSION INTRAVENOUS at 11:04

## 2025-05-19 RX ADMIN — LIDOCAINE HYDROCHLORIDE 2.5 ML: 20 INJECTION, SOLUTION INFILTRATION; PERINEURAL at 11:00

## 2025-05-19 RX ADMIN — PROPOFOL 50 MG: 10 INJECTION, EMULSION INTRAVENOUS at 11:08

## 2025-05-19 RX ADMIN — PROPOFOL 50 MG: 10 INJECTION, EMULSION INTRAVENOUS at 11:06

## 2025-05-19 RX ADMIN — PROPOFOL 50 MG: 10 INJECTION, EMULSION INTRAVENOUS at 11:09

## 2025-05-19 RX ADMIN — SODIUM CHLORIDE: 9 INJECTION, SOLUTION INTRAVENOUS at 11:00

## 2025-05-19 RX ADMIN — PROPOFOL 50 MG: 10 INJECTION, EMULSION INTRAVENOUS at 11:13

## 2025-05-19 RX ADMIN — PROPOFOL 100 MG: 10 INJECTION, EMULSION INTRAVENOUS at 11:00

## 2025-05-19 RX ADMIN — PROPOFOL 50 MG: 10 INJECTION, EMULSION INTRAVENOUS at 11:02

## 2025-05-19 SDOH — HEALTH STABILITY: MENTAL HEALTH: CURRENT SMOKER: 0

## 2025-05-19 ASSESSMENT — PAIN SCALES - GENERAL
PAINLEVEL_OUTOF10: 0 - NO PAIN
PAIN_LEVEL: 0

## 2025-05-19 ASSESSMENT — PAIN - FUNCTIONAL ASSESSMENT
PAIN_FUNCTIONAL_ASSESSMENT: 0-10

## 2025-05-19 ASSESSMENT — COLUMBIA-SUICIDE SEVERITY RATING SCALE - C-SSRS
1. IN THE PAST MONTH, HAVE YOU WISHED YOU WERE DEAD OR WISHED YOU COULD GO TO SLEEP AND NOT WAKE UP?: NO
6. HAVE YOU EVER DONE ANYTHING, STARTED TO DO ANYTHING, OR PREPARED TO DO ANYTHING TO END YOUR LIFE?: NO
2. HAVE YOU ACTUALLY HAD ANY THOUGHTS OF KILLING YOURSELF?: NO

## 2025-05-19 NOTE — ANESTHESIA POSTPROCEDURE EVALUATION
Patient: Merced Allan    Procedure Summary       Date: 05/19/25 Room / Location: [facility]    Anesthesia Start: 1059 Anesthesia Stop:     Procedure: COLONOSCOPY Diagnosis:       Diarrhea, unspecified type      History of colon polyps    Scheduled Providers: Victor Hugo Arriaza MD Responsible Provider: DANIEL Phillips    Anesthesia Type: MAC ASA Status: 3            Anesthesia Type: MAC    Vitals Value Taken Time   /67 05/19/25 11:25   Temp 36.7 05/19/25 11:25   Pulse 68 05/19/25 11:25   Resp 15 05/19/25 11:25   SpO2 96 05/19/25 11:25       Anesthesia Post Evaluation    Patient location during evaluation: bedside  Patient participation: complete - patient participated  Level of consciousness: sedated  Pain score: 0  Pain management: adequate  Airway patency: patent  Cardiovascular status: acceptable  Respiratory status: acceptable  Hydration status: acceptable  Postoperative Nausea and Vomiting: none        No notable events documented.

## 2025-05-19 NOTE — DISCHARGE INSTRUCTIONS

## 2025-05-19 NOTE — ANESTHESIA PREPROCEDURE EVALUATION
Patient: Merced Allan    Procedure Information       Date/Time: 05/19/25 1040    Scheduled providers: Victor Hugo Arriaza MD    Procedure: COLONOSCOPY    Location: [facility]            Relevant Problems   Anesthesia (within normal limits)      Cardiac   (+) Atypical chest pain   (+) Chest pain   (+) Essential hypertension      Pulmonary   (+) Other emphysema (Multi)   (+) Pneumonia      Neuro   (+) Anxiety   (+) Anxiety disorder due to medical condition   (+) Depression   (+) Major depression   (+) Severe episode of recurrent major depressive disorder, with psychotic features (Multi)      GI   (+) Dysphagia   (+) GERD (gastroesophageal reflux disease)      /Renal (within normal limits)      Liver   (+) Fatty liver   (+) Idiopathic acute pancreatitis without infection or necrosis (HHS-HCC)      Endocrine   (+) Diabetes mellitus, type 2 (Multi)   (+) Thyroid goiter      Hematology (within normal limits)      Musculoskeletal (within normal limits)      HEENT (within normal limits)      ID   (+) Human papilloma virus infection   (+) Pneumonia   (+) Tinea versicolor      Skin   (+) Tinea versicolor      GYN (within normal limits)       Clinical information reviewed:   Tobacco  Allergies  Meds  Problems  Med Hx  Surg Hx  OB Status    Fam Hx  Soc Hx        NPO Detail:  NPO/Void Status  Carbohydrate Drink Given Prior to Surgery? : N  Date of Last Liquid: 05/19/25  Time of Last Liquid: 0700  Date of Last Solid: 05/17/25  Time of Last Solid: 2100  Last Intake Type: Clear fluids  Time of Last Void: 1029         Physical Exam    Airway  Mallampati: II  TM distance: >3 FB  Neck ROM: full     Cardiovascular   Rhythm: regular  Rate: normal     Dental    Pulmonary Breath sounds clear to auscultation     Abdominal Abdomen: soft  Bowel sounds: normal           Anesthesia Plan    History of general anesthesia?: yes  History of complications of general anesthesia?: no    ASA 3     MAC     The patient is not a  current smoker.  Patient was not previously instructed to abstain from smoking on day of procedure.  Education provided regarding risk of obstructive sleep apnea.  intravenous induction   Anesthetic plan and risks discussed with patient.    Plan discussed with CRNA.

## 2025-05-19 NOTE — H&P
Outpatient Hospital Procedure H&P    Patient Profile-Procedures  Initial Info  Patient Demographics  Name Merced Allan  Date of Birth 1960  MRN [id number]  Address   [id number] [place] [id number] [address] [facility] [address]    Primary Phone Number [phone]  Secondary Phone Number    Dion Mcgovern    Procedure(s):  Colonoscopy   Primary contact name and number   Extended Emergency Contact Information  Primary Emergency Contact: Alyson Allan  Home Phone: [phone]  Relation: Child  Secondary Emergency Contact: EBONI BROTHERS  Mobile Phone: [phone]  Relation: Sibling  Preferred language: English    [facility]  Weight   Vitals:    05/19/25 1026   Weight: 68 kg (150 lb)     BMI Body mass index is 25.75 kg/m².    Allergies  RX Allergies[1]    Past Medical History   Medical History[2]    Provider assessment  Diagnosis: h/o polyps    Medication Reviewed - yes  Prior to Admission medications    Medication Sig Start Date End Date Taking? Authorizing Provider   amLODIPine (Norvasc) 5 mg tablet Take 1.5 tablets (7.5 mg) by mouth once daily. 11/25/24  Yes Dion Bahena MD   escitalopram (Lexapro) 5 mg tablet TAKE 1 TABLET BY MOUTH EVERY DAY 3/31/25  Yes Dion Bahena MD   metoprolol succinate XL (Toprol-XL) 25 mg 24 hr tablet Take 1 tablet (25 mg) by mouth once daily. 11/25/24  Yes Dion Bahena MD   omeprazole (PriLOSEC) 20 mg DR capsule Take 1 capsule (20 mg) by mouth once daily in the morning. Take before meals. Do not crush or chew. 3/6/25 3/6/26 Yes Rupal Galarza MD   blood sugar diagnostic (OneTouch Verio test strips) strip USE TO TEST ONCE DAILY 4/29/24   Dion Bahena MD   pantoprazole (ProtoNix) 20 mg EC tablet Take 1 tablet (20 mg) by mouth once daily for 20 days. Do not crush, chew, or split. 12/20/24 1/9/25  Lilly Jara MD   lansoprazole (Prevacid) 30 mg DR capsule Take 1 capsule (30 mg) by mouth once daily in the morning. Take before meals.  12/5/24 5/19/25  Dion Bahena MD   sodium,potassium,mag sulfates (Suprep) 17.5-3.13-1.6 gram solution Take one bottle twice as directed by the prep instructions 5/2/25 5/19/25  Victor Hugo Arriaza MD       Physical Exam  Vitals:    05/19/25 1026   BP: 163/76   Pulse: 61   Resp: 14   Temp: 36.1 °C (97 °F)   SpO2: 98%        General: A&Ox3, NAD.  CV: RRR. No murmur.  Resp: CTA bilaterally. No wheezing, rhonchi or rales.   Extrem: No edema.       Oropharyngeal Classification II (hard and soft palate, upper portion of tonsils and uvula visible)  ASA PS Classification 2  Sedation Plan Deep  Procedure Plan - pre-procedural (re)assesment completed by physician:  discharge/transfer patient when discharge criteria met    Victor Hugo Arriaza MD  5/19/2025 10:53 AM           [1] No Known Allergies  [2]   Past Medical History:  Diagnosis Date    Diabetes type 2     Hyperlipidemia     Hypertension

## 2025-05-29 DIAGNOSIS — I10 ESSENTIAL (PRIMARY) HYPERTENSION: Primary | ICD-10-CM

## 2025-05-30 RX ORDER — AMLODIPINE BESYLATE 5 MG/1
7.5 TABLET ORAL DAILY
Qty: 135 TABLET | Refills: 1 | Status: SHIPPED | OUTPATIENT
Start: 2025-05-30

## 2025-07-03 DIAGNOSIS — F32.0 CURRENT MILD EPISODE OF MAJOR DEPRESSIVE DISORDER WITHOUT PRIOR EPISODE: ICD-10-CM

## 2025-07-04 RX ORDER — ESCITALOPRAM OXALATE 5 MG/1
5 TABLET ORAL DAILY
Qty: 90 TABLET | Refills: 0 | Status: SHIPPED | OUTPATIENT
Start: 2025-07-04

## 2025-07-08 DIAGNOSIS — E11.9 TYPE 2 DIABETES MELLITUS WITHOUT COMPLICATIONS: ICD-10-CM

## 2025-07-08 RX ORDER — BLOOD-GLUCOSE METER
EACH MISCELLANEOUS
Qty: 100 STRIP | Refills: 3 | Status: SHIPPED | OUTPATIENT
Start: 2025-07-08

## 2025-08-11 DIAGNOSIS — E78.5 DYSLIPIDEMIA: ICD-10-CM

## 2025-08-11 DIAGNOSIS — E55.9 VITAMIN D DEFICIENCY: ICD-10-CM

## 2025-08-11 DIAGNOSIS — Z00.00 HEALTH CARE MAINTENANCE: Primary | ICD-10-CM

## 2025-08-11 DIAGNOSIS — E11.00 TYPE 2 DIABETES MELLITUS WITH HYPEROSMOLARITY WITHOUT COMA, WITHOUT LONG-TERM CURRENT USE OF INSULIN (MULTI): ICD-10-CM

## 2025-08-11 DIAGNOSIS — R53.83 OTHER FATIGUE: ICD-10-CM

## 2025-08-13 ENCOUNTER — TELEPHONE (OUTPATIENT)
Dept: PRIMARY CARE | Facility: CLINIC | Age: 65
End: 2025-08-13
Payer: MEDICARE

## 2025-08-19 ENCOUNTER — PATIENT OUTREACH (OUTPATIENT)
Dept: CARE COORDINATION | Facility: CLINIC | Age: 65
End: 2025-08-19
Payer: MEDICARE

## 2025-08-19 DIAGNOSIS — Z12.31 ENCOUNTER FOR SCREENING MAMMOGRAM FOR BREAST CANCER: ICD-10-CM

## 2025-08-20 ENCOUNTER — APPOINTMENT (OUTPATIENT)
Dept: PRIMARY CARE | Facility: CLINIC | Age: 65
End: 2025-08-20
Payer: MEDICARE

## 2025-08-20 VITALS
BODY MASS INDEX: 26.13 KG/M2 | WEIGHT: 152.2 LBS | OXYGEN SATURATION: 97 % | SYSTOLIC BLOOD PRESSURE: 120 MMHG | TEMPERATURE: 97.3 F | HEART RATE: 71 BPM | RESPIRATION RATE: 16 BRPM | DIASTOLIC BLOOD PRESSURE: 62 MMHG

## 2025-08-20 DIAGNOSIS — E53.8 VITAMIN B12 DEFICIENCY: ICD-10-CM

## 2025-08-20 DIAGNOSIS — F17.210 CIGARETTE NICOTINE DEPENDENCE WITHOUT COMPLICATION: ICD-10-CM

## 2025-08-20 DIAGNOSIS — R91.1 LUNG NODULE: ICD-10-CM

## 2025-08-20 DIAGNOSIS — E11.00 TYPE 2 DIABETES MELLITUS WITH HYPEROSMOLARITY WITHOUT COMA, WITHOUT LONG-TERM CURRENT USE OF INSULIN (MULTI): ICD-10-CM

## 2025-08-20 DIAGNOSIS — I10 ESSENTIAL HYPERTENSION: ICD-10-CM

## 2025-08-20 DIAGNOSIS — Z13.820 SCREENING FOR OSTEOPOROSIS: Primary | ICD-10-CM

## 2025-08-20 DIAGNOSIS — E78.5 DYSLIPIDEMIA: ICD-10-CM

## 2025-08-20 DIAGNOSIS — F33.3 SEVERE EPISODE OF RECURRENT MAJOR DEPRESSIVE DISORDER, WITH PSYCHOTIC FEATURES (MULTI): ICD-10-CM

## 2025-08-20 LAB — POC HEMOGLOBIN A1C: 6.1 % (ref 4.2–6.5)

## 2025-08-20 PROCEDURE — 99214 OFFICE O/P EST MOD 30 MIN: CPT | Performed by: INTERNAL MEDICINE

## 2025-08-20 PROCEDURE — 3044F HG A1C LEVEL LT 7.0%: CPT | Performed by: INTERNAL MEDICINE

## 2025-08-20 PROCEDURE — 3078F DIAST BP <80 MM HG: CPT | Performed by: INTERNAL MEDICINE

## 2025-08-20 PROCEDURE — 3074F SYST BP LT 130 MM HG: CPT | Performed by: INTERNAL MEDICINE

## 2025-08-20 PROCEDURE — 1160F RVW MEDS BY RX/DR IN RCRD: CPT | Performed by: INTERNAL MEDICINE

## 2025-08-20 PROCEDURE — 83036 HEMOGLOBIN GLYCOSYLATED A1C: CPT | Performed by: INTERNAL MEDICINE

## 2025-08-20 PROCEDURE — 1123F ACP DISCUSS/DSCN MKR DOCD: CPT | Performed by: INTERNAL MEDICINE

## 2025-08-20 PROCEDURE — 1159F MED LIST DOCD IN RCRD: CPT | Performed by: INTERNAL MEDICINE

## 2025-08-20 RX ORDER — INSULIN PUMP SYRINGE, 3 ML
1 EACH MISCELLANEOUS 3 TIMES DAILY PRN
Qty: 1 KIT | Refills: 0 | Status: SHIPPED | OUTPATIENT
Start: 2025-08-20 | End: 2025-08-21

## 2025-08-20 ASSESSMENT — ENCOUNTER SYMPTOMS
JOINT SWELLING: 0
COUGH: 0
DIZZINESS: 0
UNEXPECTED WEIGHT CHANGE: 0
SORE THROAT: 0
NAUSEA: 0
ADENOPATHY: 0
CONFUSION: 0
CHILLS: 0
WEAKNESS: 0
CHEST TIGHTNESS: 0
FATIGUE: 0
DIARRHEA: 0
CONSTIPATION: 0
WHEEZING: 0
PALPITATIONS: 0
DYSURIA: 0
ABDOMINAL PAIN: 0
SLEEP DISTURBANCE: 0
SHORTNESS OF BREATH: 0
ARTHRALGIAS: 0
VOMITING: 0

## 2025-08-20 ASSESSMENT — PATIENT HEALTH QUESTIONNAIRE - PHQ9
SUM OF ALL RESPONSES TO PHQ9 QUESTIONS 1 AND 2: 0
1. LITTLE INTEREST OR PLEASURE IN DOING THINGS: NOT AT ALL
2. FEELING DOWN, DEPRESSED OR HOPELESS: NOT AT ALL

## 2025-09-04 LAB
25(OH)D3+25(OH)D2 SERPL-MCNC: 32 NG/ML (ref 30–100)
ALBUMIN SERPL-MCNC: 4.6 G/DL (ref 3.6–5.1)
ALBUMIN/CREAT UR: 14 MG/G CREAT
ALP SERPL-CCNC: 62 U/L (ref 37–153)
ALT SERPL-CCNC: 24 U/L (ref 6–29)
ANION GAP SERPL CALCULATED.4IONS-SCNC: 11 MMOL/L (CALC) (ref 7–17)
AST SERPL-CCNC: 19 U/L (ref 10–35)
BASOPHILS # BLD AUTO: 32 CELLS/UL (ref 0–200)
BASOPHILS NFR BLD AUTO: 0.4 %
BILIRUB SERPL-MCNC: 0.5 MG/DL (ref 0.2–1.2)
BUN SERPL-MCNC: 16 MG/DL (ref 7–25)
CALCIUM SERPL-MCNC: 9.4 MG/DL (ref 8.6–10.4)
CHLORIDE SERPL-SCNC: 102 MMOL/L (ref 98–110)
CHOLEST SERPL-MCNC: 177 MG/DL
CHOLEST/HDLC SERPL: 4.9 (CALC)
CO2 SERPL-SCNC: 26 MMOL/L (ref 20–32)
CREAT SERPL-MCNC: 0.82 MG/DL (ref 0.5–1.05)
CREAT UR-MCNC: 136 MG/DL (ref 20–275)
EGFRCR SERPLBLD CKD-EPI 2021: 79 ML/MIN/1.73M2
EOSINOPHIL # BLD AUTO: 232 CELLS/UL (ref 15–500)
EOSINOPHIL NFR BLD AUTO: 2.9 %
ERYTHROCYTE [DISTWIDTH] IN BLOOD BY AUTOMATED COUNT: 13.1 % (ref 11–15)
GLUCOSE SERPL-MCNC: 127 MG/DL (ref 65–99)
HCT VFR BLD AUTO: 43 % (ref 35–45)
HDLC SERPL-MCNC: 36 MG/DL
HGB BLD-MCNC: 13.6 G/DL (ref 11.7–15.5)
LDLC SERPL CALC-MCNC: 101 MG/DL (CALC)
LYMPHOCYTES # BLD AUTO: 2488 CELLS/UL (ref 850–3900)
LYMPHOCYTES NFR BLD AUTO: 31.1 %
MAGNESIUM SERPL-MCNC: 2.3 MG/DL (ref 1.5–2.5)
MCH RBC QN AUTO: 28.8 PG (ref 27–33)
MCHC RBC AUTO-ENTMCNC: 31.6 G/DL (ref 32–36)
MCV RBC AUTO: 90.9 FL (ref 80–100)
MICROALBUMIN UR-MCNC: 1.9 MG/DL
MONOCYTES # BLD AUTO: 536 CELLS/UL (ref 200–950)
MONOCYTES NFR BLD AUTO: 6.7 %
NEUTROPHILS # BLD AUTO: 4712 CELLS/UL (ref 1500–7800)
NEUTROPHILS NFR BLD AUTO: 58.9 %
NONHDLC SERPL-MCNC: 141 MG/DL (CALC)
PLATELET # BLD AUTO: 272 THOUSAND/UL (ref 140–400)
PMV BLD REES-ECKER: 10 FL (ref 7.5–12.5)
POTASSIUM SERPL-SCNC: 4.3 MMOL/L (ref 3.5–5.3)
PROT SERPL-MCNC: 7.6 G/DL (ref 6.1–8.1)
RBC # BLD AUTO: 4.73 MILLION/UL (ref 3.8–5.1)
SODIUM SERPL-SCNC: 139 MMOL/L (ref 135–146)
TRIGL SERPL-MCNC: 300 MG/DL
TSH SERPL-ACNC: 1.55 MIU/L (ref 0.4–4.5)
WBC # BLD AUTO: 8 THOUSAND/UL (ref 3.8–10.8)

## 2025-09-22 ENCOUNTER — APPOINTMENT (OUTPATIENT)
Dept: PRIMARY CARE | Facility: CLINIC | Age: 65
End: 2025-09-22
Payer: MEDICARE